# Patient Record
Sex: FEMALE | Race: WHITE | NOT HISPANIC OR LATINO | URBAN - METROPOLITAN AREA
[De-identification: names, ages, dates, MRNs, and addresses within clinical notes are randomized per-mention and may not be internally consistent; named-entity substitution may affect disease eponyms.]

---

## 2017-05-04 ENCOUNTER — COMMUNICATION - HEALTHEAST (OUTPATIENT)
Dept: HEALTH INFORMATION MANAGEMENT | Facility: CLINIC | Age: 32
End: 2017-05-04

## 2019-05-06 ENCOUNTER — OFFICE VISIT - HEALTHEAST (OUTPATIENT)
Dept: FAMILY MEDICINE | Facility: CLINIC | Age: 34
End: 2019-05-06

## 2019-05-06 DIAGNOSIS — Z00.00 ROUTINE GENERAL MEDICAL EXAMINATION AT A HEALTH CARE FACILITY: ICD-10-CM

## 2019-05-06 DIAGNOSIS — Z12.4 SCREENING FOR CERVICAL CANCER: ICD-10-CM

## 2019-05-06 DIAGNOSIS — Z13.1 SCREENING FOR DIABETES MELLITUS: ICD-10-CM

## 2019-05-06 DIAGNOSIS — Z78.9 VEGAN DIET: ICD-10-CM

## 2019-05-06 LAB
ANION GAP SERPL CALCULATED.3IONS-SCNC: 13 MMOL/L (ref 5–18)
BUN SERPL-MCNC: 7 MG/DL (ref 8–22)
CALCIUM SERPL-MCNC: 9.4 MG/DL (ref 8.5–10.5)
CHLORIDE BLD-SCNC: 105 MMOL/L (ref 98–107)
CO2 SERPL-SCNC: 22 MMOL/L (ref 22–31)
CREAT SERPL-MCNC: 0.67 MG/DL (ref 0.6–1.1)
ERYTHROCYTE [DISTWIDTH] IN BLOOD BY AUTOMATED COUNT: 12.1 % (ref 11–14.5)
GFR SERPL CREATININE-BSD FRML MDRD: >60 ML/MIN/1.73M2
GLUCOSE BLD-MCNC: 117 MG/DL (ref 70–125)
HBA1C MFR BLD: 5.6 % (ref 3.5–6)
HCT VFR BLD AUTO: 40.5 % (ref 35–47)
HGB BLD-MCNC: 13.5 G/DL (ref 12–16)
IRON SATN MFR SERPL: 17 % (ref 20–50)
IRON SERPL-MCNC: 55 UG/DL (ref 42–175)
MCH RBC QN AUTO: 28.5 PG (ref 27–34)
MCHC RBC AUTO-ENTMCNC: 33.4 G/DL (ref 32–36)
MCV RBC AUTO: 85 FL (ref 80–100)
PLATELET # BLD AUTO: 283 THOU/UL (ref 140–440)
PMV BLD AUTO: 8.7 FL (ref 7–10)
POTASSIUM BLD-SCNC: 3.9 MMOL/L (ref 3.5–5)
RBC # BLD AUTO: 4.74 MILL/UL (ref 3.8–5.4)
SODIUM SERPL-SCNC: 140 MMOL/L (ref 136–145)
TIBC SERPL-MCNC: 323 UG/DL (ref 313–563)
TRANSFERRIN SERPL-MCNC: 258 MG/DL (ref 212–360)
WBC: 5.5 THOU/UL (ref 4–11)

## 2019-05-06 ASSESSMENT — MIFFLIN-ST. JEOR: SCORE: 1109.32

## 2019-05-07 LAB
25(OH)D3 SERPL-MCNC: 26.6 NG/ML (ref 30–80)
25(OH)D3 SERPL-MCNC: 26.6 NG/ML (ref 30–80)
FERRITIN SERPL-MCNC: 13 NG/ML (ref 10–130)
HPV SOURCE: NORMAL
HUMAN PAPILLOMA VIRUS 16 DNA: NEGATIVE
HUMAN PAPILLOMA VIRUS 18 DNA: NEGATIVE
HUMAN PAPILLOMA VIRUS FINAL DIAGNOSIS: NORMAL
HUMAN PAPILLOMA VIRUS OTHER HR: NEGATIVE
SPECIMEN DESCRIPTION: NORMAL
VIT B12 SERPL-MCNC: 1716 PG/ML (ref 213–816)

## 2019-05-10 LAB
BKR LAB AP ABNORMAL BLEEDING: NO
BKR LAB AP BIRTH CONTROL/HORMONES: NORMAL
BKR LAB AP CERVICAL APPEARANCE: NORMAL
BKR LAB AP GYN ADEQUACY: NORMAL
BKR LAB AP GYN INTERPRETATION: NORMAL
BKR LAB AP HPV REFLEX: NORMAL
BKR LAB AP LMP: NORMAL
BKR LAB AP PATIENT STATUS: NORMAL
BKR LAB AP PREVIOUS ABNORMAL: NORMAL
BKR LAB AP PREVIOUS NORMAL: NORMAL
HIGH RISK?: YES
PATH REPORT.COMMENTS IMP SPEC: NORMAL
RESULT FLAG (HE HISTORICAL CONVERSION): NORMAL

## 2020-08-27 ENCOUNTER — TRANSFERRED RECORDS (OUTPATIENT)
Dept: HEALTH INFORMATION MANAGEMENT | Facility: CLINIC | Age: 35
End: 2020-08-27

## 2020-08-28 ENCOUNTER — TELEPHONE (OUTPATIENT)
Dept: OPHTHALMOLOGY | Facility: CLINIC | Age: 35
End: 2020-08-28

## 2020-08-28 ENCOUNTER — OFFICE VISIT (OUTPATIENT)
Dept: OPHTHALMOLOGY | Facility: CLINIC | Age: 35
End: 2020-08-28
Attending: OPHTHALMOLOGY
Payer: COMMERCIAL

## 2020-08-28 DIAGNOSIS — B60.13 ACANTHAMOEBA KERATITIS: Primary | ICD-10-CM

## 2020-08-28 DIAGNOSIS — B60.10 ACANTHAMOEBA INFECTION: Primary | ICD-10-CM

## 2020-08-28 DIAGNOSIS — B60.13 ACANTHAMOEBA KERATITIS: ICD-10-CM

## 2020-08-28 DIAGNOSIS — H18.822 CONTACT LENS OVERWEAR OF LEFT EYE: ICD-10-CM

## 2020-08-28 DIAGNOSIS — H04.122 DRY EYE OF LEFT SIDE: ICD-10-CM

## 2020-08-28 DIAGNOSIS — H18.20 CORNEAL EDEMA OF LEFT EYE: ICD-10-CM

## 2020-08-28 LAB
GRAM STN SPEC: NORMAL
KOH PREP SPEC: NORMAL
SPECIMEN SOURCE: NORMAL
SPECIMEN SOURCE: NORMAL

## 2020-08-28 PROCEDURE — 87081 CULTURE SCREEN ONLY: CPT | Performed by: STUDENT IN AN ORGANIZED HEALTH CARE EDUCATION/TRAINING PROGRAM

## 2020-08-28 PROCEDURE — 92285 EXTERNAL OCULAR PHOTOGRAPHY: CPT | Mod: ZF | Performed by: OPHTHALMOLOGY

## 2020-08-28 PROCEDURE — 87207 SMEAR SPECIAL STAIN: CPT | Performed by: STUDENT IN AN ORGANIZED HEALTH CARE EDUCATION/TRAINING PROGRAM

## 2020-08-28 PROCEDURE — 87205 SMEAR GRAM STAIN: CPT | Performed by: STUDENT IN AN ORGANIZED HEALTH CARE EDUCATION/TRAINING PROGRAM

## 2020-08-28 PROCEDURE — 92132 CPTRZD OPH DX IMG ANT SGM: CPT | Mod: ZF | Performed by: OPHTHALMOLOGY

## 2020-08-28 PROCEDURE — G0463 HOSPITAL OUTPT CLINIC VISIT: HCPCS | Mod: ZF

## 2020-08-28 PROCEDURE — 87015 SPECIMEN INFECT AGNT CONCNTJ: CPT | Performed by: STUDENT IN AN ORGANIZED HEALTH CARE EDUCATION/TRAINING PROGRAM

## 2020-08-28 PROCEDURE — 65430 CORNEAL SMEAR: CPT | Mod: ZF | Performed by: OPHTHALMOLOGY

## 2020-08-28 PROCEDURE — 87070 CULTURE OTHR SPECIMN AEROBIC: CPT | Performed by: STUDENT IN AN ORGANIZED HEALTH CARE EDUCATION/TRAINING PROGRAM

## 2020-08-28 PROCEDURE — 87075 CULTR BACTERIA EXCEPT BLOOD: CPT | Performed by: STUDENT IN AN ORGANIZED HEALTH CARE EDUCATION/TRAINING PROGRAM

## 2020-08-28 PROCEDURE — 87210 SMEAR WET MOUNT SALINE/INK: CPT | Performed by: STUDENT IN AN ORGANIZED HEALTH CARE EDUCATION/TRAINING PROGRAM

## 2020-08-28 PROCEDURE — 87076 CULTURE ANAEROBE IDENT EACH: CPT | Performed by: STUDENT IN AN ORGANIZED HEALTH CARE EDUCATION/TRAINING PROGRAM

## 2020-08-28 PROCEDURE — 87102 FUNGUS ISOLATION CULTURE: CPT | Performed by: STUDENT IN AN ORGANIZED HEALTH CARE EDUCATION/TRAINING PROGRAM

## 2020-08-28 RX ORDER — MOXIFLOXACIN 5 MG/ML
1 SOLUTION/ DROPS OPHTHALMIC 4 TIMES DAILY
COMMUNITY
End: 2021-05-07

## 2020-08-28 RX ORDER — IBUPROFEN 800 MG/1
800 TABLET, FILM COATED ORAL EVERY 6 HOURS PRN
Qty: 30 TABLET | Refills: 1 | Status: SHIPPED | OUTPATIENT
Start: 2020-08-28 | End: 2021-03-30

## 2020-08-28 RX ORDER — VALACYCLOVIR HYDROCHLORIDE 500 MG/1
500 TABLET, FILM COATED ORAL 2 TIMES DAILY
COMMUNITY
End: 2020-09-23

## 2020-08-28 ASSESSMENT — SLIT LAMP EXAM - LIDS
COMMENTS: NORMAL
COMMENTS: NORMAL

## 2020-08-28 ASSESSMENT — TONOMETRY
OD_IOP_MMHG: 21
IOP_METHOD: ICARE
OS_IOP_MMHG: 11

## 2020-08-28 ASSESSMENT — VISUAL ACUITY
OD_CC: 20/20
METHOD: SNELLEN - LINEAR
CORRECTION_TYPE: GLASSES
OS_CC: CF
OS_PH_CC: 20/100

## 2020-08-28 ASSESSMENT — CONF VISUAL FIELD
OD_NORMAL: 1
METHOD: COUNTING FINGERS
OS_NORMAL: 1

## 2020-08-28 NOTE — PATIENT INSTRUCTIONS
USE PHMB DROPS EVERY 1 HOUR WHILE AWAKE. IF YOU WAKE UP, PUT A DROP IN THE EYE.    USE MOXIFLOXACIN (TAN CAP): 4x/ DAY    CONTINUE VALTREX PILLS 3x/ DAY    USE ARTIFICIAL TEARS EVERY 2-3 HOURS (NEVER RIGHT AFTER A MEDICATION)

## 2020-08-28 NOTE — NURSING NOTE
Chief Complaints and History of Present Illnesses   Patient presents with     Corneal Evaluation     Chief Complaint(s) and History of Present Illness(es)     Corneal Evaluation     Laterality: left eye    Associated symptoms: eye pain, redness, tearing and photophobia    Treatments tried: eye drops and artificial tears    Pain scale: 5/10              Comments          Dr. Sarai Bassett referred patient due to suspicious for acanthamoeba left eye.  On Tuesday (10 days ago) she started having aching in her left eye.  On Monday she was seen at [facility] and was diagnosed with herpetic keratitis and started on antiviral medication, both oral valacyclovir and moxifloxacin.  The medications did not help, she was then referred to Dr Bassett and this appointment.  Her vision is currently quite blurred.    She wore contacts daily wear contacts until the pain started 10 days ago, often wearing them several days in row and keeping them in saline over night.    Frances Stoddard, CHASE 11:29 AM  August 28, 2020

## 2020-08-28 NOTE — TELEPHONE ENCOUNTER
Dr. Sarai Bassett referring pt to eye clinic     Pt seen by Dr. Bassett today for first time    suspicious for acanthamoeba     Left eye treated currently treated by another provider for herpetic etiology starting earlier this week    Reviewed with cornea fellow and scheduled this AM with Dr. Jagruti Charles RN 10:01 AM 08/28/20

## 2020-08-28 NOTE — PROGRESS NOTES
CC: Possible Acanthamoeba     Referring provider: Dr. 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 Patient reports that she noticed redness of the eye starting 10 days ago (Tues 8/18). It was painful but without significant discharge. She saw an optometrist on Monday 8/24 who noted epithelial staining that looked like a dendrite. She was started on Valtrex 500 TID and moxifloxacin. She was not improving, so she was referred to the cornea specialist Dr. Sarai Bassett who noted perineural infiltrates concerning for acanthamoeba and sent patient to [facility] same day for evaluation and confocal microscopy.    Patient is CL wearer. She has daily disposable CL's but will wear the lenses for multiple days at a time after they are placed in saline.     Interval Hx: New patient to Dr. Chand. Patient has eye pain OS, blurry vision OS, photophobia, mild tearing, no significant discharge, no new flashes, floaters, diplopia. Not wearing contact lenses since onset of symptoms.    POHx:  CTL wearer: Yes    Family hx of eye disease: No  Social Hx: (-) smoking, (social) EtOH, (-) illicit drugs    Meds:  Valtrex 500 TID  Moxifloxacin QID    Surgical Hx:  None    A/P:  #Early Acanthamoeba keratitis, left eye  - symptoms began ~1.5 weeks ago (8/18), has been treated with Valtrex & moxifloxacin - has NOT received any topical steroids  - radial perineuritis evident on exam  - confocal 8/28 showing signet ring cells located in epithelium and superficial stroma, but did not appear in deeper stroma  - slit lamp photos today  - central 8 mm of epithelium debrided at slit lamp, and samples obtained for Acanthamoeba culture, KOH, gram stain, and routine anaerobic and aerobic culture.   - bandage CL placed (Acuvue Oasys 8.4) - two additional BCL's provided just in case this one comes out  - Meds:   - begin PHMB drops q1 hour while awake   - continue moxifloxacin  QID   - artificial tears every 2-3 hours   - for pain - ibuprofen 800 mg every 6 hours, alternate with Tylenol (pt reports she had limited renal eval for possible kidney donation for her father. She was told her kidney is large, but renal function is normal, and she has not been told to avoid renally excreted medications)  - discussed warning sign's and symptoms to call ASAP  - discussed importance of adhering to good CL hygiene to avoid infection in future      Follow up: Monday 8/31 - SLP OS  --    Alicia Guido MD  Cornea & External Disease Fellow    Attending Physician Attestation:  Complete documentation of historical and exam elements from today's encounter can be found in the full encounter summary report (not reduplicated in this progress note).  I personally obtained the chief complaint(s) and history of present illness.  I confirmed and edited as necessary the review of systems, past medical/surgical history, family history, social history, and examination findings as documented by others; and I examined the patient myself.  I personally reviewed the relevant tests, images, and reports as documented above.  I formulated and edited as necessary the assessment and plan and discussed the findings and management plan with the patient and family. I personally reviewed the ophthalmic test(s) associated with this encounter, agree with the interpretation(s) as documented by the resident/fellow, and have edited the corresponding report(s) as necessary. I was present for the key portions of the procedure and immediately available for the remainder. - Jose G Chand MD    I personally spent great than 40min with the patient, of which >50% of the time was spent face to face with the patient, counseling and coordinating care with the patient. This excludes time spent performing the procedure. We discussed the complexity of her diagnosis, the need for further information prior to proceeding with yet another  surgery, and the unknown prognosis for the patient at this time.    Jose G Chand MD

## 2020-08-29 LAB
PARASITE SPEC INSPECT: NORMAL
PARASITE SPEC INSPECT: NORMAL
SPECIMEN SOURCE: NORMAL

## 2020-08-31 ENCOUNTER — TELEPHONE (OUTPATIENT)
Dept: OPHTHALMOLOGY | Facility: CLINIC | Age: 35
End: 2020-08-31
Payer: COMMERCIAL

## 2020-08-31 ENCOUNTER — OFFICE VISIT (OUTPATIENT)
Dept: OPHTHALMOLOGY | Facility: CLINIC | Age: 35
End: 2020-08-31
Attending: OPHTHALMOLOGY
Payer: COMMERCIAL

## 2020-08-31 DIAGNOSIS — B60.13 ACANTHAMOEBA KERATITIS: Primary | ICD-10-CM

## 2020-08-31 DIAGNOSIS — H04.122 DRY EYE OF LEFT SIDE: ICD-10-CM

## 2020-08-31 DIAGNOSIS — H18.20 CORNEAL EDEMA OF LEFT EYE: ICD-10-CM

## 2020-08-31 DIAGNOSIS — H18.822 CONTACT LENS OVERWEAR OF LEFT EYE: ICD-10-CM

## 2020-08-31 LAB
BACTERIA SPEC CULT: ABNORMAL
BACTERIA SPEC CULT: ABNORMAL
SPECIMEN SOURCE: ABNORMAL

## 2020-08-31 PROCEDURE — G0463 HOSPITAL OUTPT CLINIC VISIT: HCPCS | Mod: ZF

## 2020-08-31 RX ORDER — CETIRIZINE HYDROCHLORIDE 10 MG/1
10 TABLET ORAL DAILY
COMMUNITY

## 2020-08-31 ASSESSMENT — VISUAL ACUITY
METHOD: SNELLEN - LINEAR
OS_PH_CC: 20/30
OS_CC: 20/60-
OD_CC: 20/20

## 2020-08-31 ASSESSMENT — TONOMETRY
IOP_METHOD: ICARE
OD_IOP_MMHG: 17
OS_IOP_MMHG: 11

## 2020-08-31 ASSESSMENT — SLIT LAMP EXAM - LIDS
COMMENTS: NORMAL
COMMENTS: NORMAL

## 2020-08-31 NOTE — PROGRESS NOTES
CC: Possible Acanthamoeba     Referring provider: Dr. Sarai Basstet    HPI:  Chandrakant Cruz is a(n) 34 year old female who presents for urgent evaluation of possible acanthamoeba keratitis. Patient reports that she noticed redness of the eye starting 10 days ago (Tues 8/18). It was painful but without significant discharge. She saw an optometrist on Monday 8/24 who noted epithelial staining that looked like a dendrite. She was started on Valtrex 500 TID and moxifloxacin. She was not improving, so she was referred to the cornea specialist Dr. Sarai Bassett who noted perineural infiltrates concerning for acanthamoeba and sent patient to [facility] same day for evaluation and confocal microscopy.    Patient is CL wearer. She has daily disposable CL's but will wear the lenses for multiple days at a time after they are placed in saline.     Interval Hx: MUCH improved since last visit. Vision improved yesterday. Pain is improving as well. Still has redness. She has some irritation with the PHMB drops but it is tolerable. Not using Flonase d/t active infection. No new flashes, floaters, or diplopia. No tearing.     POHx:  CTL wearer: Yes    Family hx of eye disease: No  Social Hx: (-) smoking, (social) EtOH, (-) illicit drugs    Meds:  Valtrex 500 TID  Moxifloxacin QID    Surgical Hx:  None    A/P:  #Early Acanthamoeba keratitis, left eye  - symptoms began ~8/18, initially treated with Valtrex & moxifloxacin but had NOT received any topical steroids  - radial perineuritis evident on exam  - confocal 8/28 showing signet ring cells located in epithelium and superficial stroma, but did not appear in deeper stroma  - slit lamp photos today- taken but did not get saved unfortunately  - central 8 mm of epithelium debrided at slit lamp, and samples obtained for Acanthamoeba culture, KOH, gram stain, and routine anaerobic and aerobic culture (8/28/20)  - culture positive for Acanthamoeba  - Meds:   - discontinue BCL   - Continue PHMB every 1  hour while awake   - Reduce moxifloxacin to BID   - Stop Valtrex   - Artificial tears every 2-3 hours   - For pain - ibuprofen 800 mg every 6 hours, alternate with Tylenol (pt reports she had limited renal eval for possible kidney donation for her father. She was told her kidney is large, but renal function is normal, and she has not been told to avoid renally excreted medications)  - discussed warning sign's and symptoms to call ASAP  - discussed importance of adhering to good CL hygiene to avoid infection in future    #Allergic conjunctivitis, both eyes  - pataday prn  - ok to use Flonase     Follow up: 1 week w/ SLP:BOBO Guido MD  Cornea & External Disease Fellow    --  Attending Physician Attestation:  Complete documentation of historical and exam elements from today's encounter can be found in the full encounter summary report (not reduplicated in this progress note).  I personally obtained the chief complaint(s) and history of present illness.  I confirmed and edited as necessary the review of systems, past medical/surgical history, family history, social history, and examination findings as documented by others; and I examined the patient myself.  I personally reviewed the relevant tests, images, and reports as documented above.  I formulated and edited as necessary the assessment and plan and discussed the findings and management plan with the patient and family. - Jose G Chand MD

## 2020-08-31 NOTE — PATIENT INSTRUCTIONS
USE PHMB DROPS EVERY 1 HOUR WHILE AWAKE. IF YOU WAKE UP, PUT A DROP IN THE EYE.    DECREASE MOXIFLOXACIN (TAN CAP): 2x/ DAY    STOP VALTREX PILLS    OK TO USE FLONASE    USE ARTIFICIAL TEARS EVERY 2-3 HOURS (NEVER RIGHT AFTER A MEDICATION), as able

## 2020-08-31 NOTE — TELEPHONE ENCOUNTER
Azucena from the micro lab called to report results.  Parasite culture from the left eye collected on August 28th is Positive for Acanthamoeba

## 2020-09-03 ENCOUNTER — TELEPHONE (OUTPATIENT)
Dept: OPHTHALMOLOGY | Facility: CLINIC | Age: 35
End: 2020-09-03

## 2020-09-03 NOTE — TELEPHONE ENCOUNTER
I agree that PHMB can be irritating. I am forwarding this message to Dr. Guido as she saw the patient on Monday. We will let the patient know if she can use BCL or any other strategies.

## 2020-09-03 NOTE — TELEPHONE ENCOUNTER
ADDENDUM 9/3 at 12:30 PM: I called and spoke with patient. She reports she is not having that much discomfort with the PHMB drops themselves, but the eye in general was just hurting more. She is waking up with the eye more swollen and closed. Vision is about the same. No increase in drainage from the eye. We discussed that this level of pain is unfortunately expected from this type of infection. I recommended regular artificial tears (but never after PHMB dose), cold compresses, and regular Tylenol/ibuprofen. Discussed that if she wants, Dr. Chand is OK with her placing a bandage contact lens in the eye. Patient does not feel this is necessary at this time and wants maximal treatment efficacy from the drops, so she will hold off. Dr. Chand also OK'd her to switch to q2 hour PHMB after one week of treatment. Mutually agreed for her to continue q1hour dose through the weekend, and on Monday she can switch to q2 hour PHMB. All of her questions were answered. She will call with change in symptoms/worsening course.    Alicia Guido MD  Cornea & External Disease Fellow          Pt calling clinic this AM to review eye pain    Pt last seen on Monday and currently being treated for acanthamoeba    Bandage lens taking out this last Monday    Spoke to pt at 0830    Pt states increase burning and tearing past couple days    Pt using PHMB every hour WA and not been using PF artificial tears with new tearing     Pt concerned of worsening infection    Reviewed PHMB drop not comfortable and can have pain with this type of infection    Recommended restarting the preservative free artificial tears and will review with cornea team if would recommend placing bandage lens in eye (pt has couple at home yet) vs appt for pt concern of worsening infection vs other options/recommendations    Jordan Charles, BRANDON 8:37 AM 09/03/20

## 2020-09-04 LAB
BACTERIA SPEC CULT: NO GROWTH
SPECIMEN SOURCE: NORMAL

## 2020-09-05 LAB
BACTERIA SPEC CULT: ABNORMAL
BACTERIA SPEC CULT: ABNORMAL
Lab: ABNORMAL
SPECIMEN SOURCE: ABNORMAL

## 2020-09-09 ENCOUNTER — TELEPHONE (OUTPATIENT)
Dept: OPHTHALMOLOGY | Facility: CLINIC | Age: 35
End: 2020-09-09

## 2020-09-09 NOTE — TELEPHONE ENCOUNTER
[facility] called stating that Dr. Bassett hadn't gotten any notes after visit with Dr. Chand at the end of August - printed the visit notes and faxed to their office today.    CASEY Garrett 3:00 PM September 9, 2020

## 2020-09-10 ENCOUNTER — TELEPHONE (OUTPATIENT)
Dept: OPHTHALMOLOGY | Facility: CLINIC | Age: 35
End: 2020-09-10

## 2020-09-10 ENCOUNTER — OFFICE VISIT (OUTPATIENT)
Dept: OPHTHALMOLOGY | Facility: CLINIC | Age: 35
End: 2020-09-10
Attending: OPHTHALMOLOGY
Payer: COMMERCIAL

## 2020-09-10 DIAGNOSIS — H18.822 CONTACT LENS OVERWEAR OF LEFT EYE: ICD-10-CM

## 2020-09-10 DIAGNOSIS — H18.20 CORNEAL EDEMA OF LEFT EYE: ICD-10-CM

## 2020-09-10 DIAGNOSIS — B60.13 ACANTHAMOEBA KERATITIS: Primary | ICD-10-CM

## 2020-09-10 DIAGNOSIS — H04.122 DRY EYE OF LEFT SIDE: ICD-10-CM

## 2020-09-10 DIAGNOSIS — B60.10 ACANTHAMOEBA INFECTION: ICD-10-CM

## 2020-09-10 PROCEDURE — G0463 HOSPITAL OUTPT CLINIC VISIT: HCPCS | Mod: ZF

## 2020-09-10 ASSESSMENT — SLIT LAMP EXAM - LIDS
COMMENTS: NORMAL
COMMENTS: NORMAL

## 2020-09-10 ASSESSMENT — VISUAL ACUITY
CORRECTION_TYPE: GLASSES
OS_CC: HM
METHOD: SNELLEN - LINEAR
OD_CC: 20/20

## 2020-09-10 ASSESSMENT — CONF VISUAL FIELD
OS_INFERIOR_NASAL_RESTRICTION: 1
OS_SUPERIOR_TEMPORAL_RESTRICTION: 1
OS_SUPERIOR_NASAL_RESTRICTION: 1
OS_INFERIOR_TEMPORAL_RESTRICTION: 1
METHOD: COUNTING FINGERS
OD_NORMAL: 1

## 2020-09-10 ASSESSMENT — EXTERNAL EXAM - LEFT EYE: OS_EXAM: NORMAL

## 2020-09-10 ASSESSMENT — TONOMETRY
OS_IOP_MMHG: 14
OD_IOP_MMHG: 21
IOP_METHOD: ICARE

## 2020-09-10 ASSESSMENT — EXTERNAL EXAM - RIGHT EYE: OD_EXAM: NORMAL

## 2020-09-10 NOTE — NURSING NOTE
Chief Complaints and History of Present Illnesses   Patient presents with     Keratitis Follow Up     1.5 week follow up Acanthamoeba keratitis, left eye     Chief Complaint(s) and History of Present Illness(es)     Keratitis Follow Up     Laterality: left eye    Characteristics: constant    Associated symptoms: blurred vision, eye pain and photophobia    Pain scale: 7/10    Comments: 1.5 week follow up Acanthamoeba keratitis, left eye              Comments     Pt complains of having complete vision loss LE today - notes vision goes up and down. Sometimes she sees stuff and other times she doesn't.  Complains of having intermittent pain LE.  Complains of LE tearing constantly & being light sensitive.  Complains of LE cornea becoming opaque   Ocular meds: PHMB q1h LE, Moxifloxacin BID LE, & AT's occasionally.    Adina Slater OT 8:42 AM September 10, 2020

## 2020-09-10 NOTE — TELEPHONE ENCOUNTER
Worsening pain and vision loss times 2 days.  Gray/opaque cornea now  H/o Acanthamoeba     Scheduled with Dr. Akbar in open new time slot at 0830 today    Pt aware of date/time/location at [facility]    Jordan Charles RN 7:32 AM 09/10/20            [facility]    Phone Message    May a detailed message be left on voicemail: yes     Reason for Call: Symptoms or Concerns     If patient has red-flag symptoms, warm transfer to triage line    Current symptom or concern: Lt eye pupil is gray and she said it has gone completely blind    Symptoms have been present for:  2 day(s)    Has patient previously been seen for this? Yes    By : Dr Chand    Date: 08/31/20    Are there any new or worsening symptoms? Yes: said gotten worse again - knows she has an Appt tomm but wasn't sure if she needed to be checked today       Action Taken: Message routed to:  Other: [facility]    Travel Screening: Not Applicable

## 2020-09-10 NOTE — PROGRESS NOTES
"CC: Possible Acanthamoeba     Referring provider: Dr. 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 Patient reports that she noticed redness of the eye starting 10 days ago (Tues 8/18). It was painful but without significant discharge. She saw an optometrist on Monday 8/24 who noted epithelial staining that looked like a dendrite. She was started on Valtrex 500 TID and moxifloxacin. She was not improving, so she was referred to the cornea specialist Dr. Sarai Bassett who noted perineural infiltrates concerning for acanthamoeba and sent patient to [facility] same day for evaluation and confocal microscopy.    Patient is CL wearer. She has daily disposable CL's but will wear the lenses for multiple days at a time after they are placed in saline.     Interval Hx: Patient called in this morning due to 2 days of worsening pain and vision. Last visit with Dr. Chand was 10 days ago, but at that time she was \"much improved.\" She describes the vision as being as bad as when this first all started. It has fluctuated drastically in that time period - today VA HM. Still using PHMB Q1H (total 18x/day); Vigamox BID OS.       POHx:  CTL wearer: Yes    Family hx of eye disease: No  Social Hx: (-) smoking, (social) EtOH, (-) illicit drugs    Meds:    Moxifloxacin BID  PHMB Q1H while awake OS  Ibuprofen 800 mg Q6H   Tylenol 500mg prn       Surgical Hx:  None    A/P:  # Acanthamoeba keratitis, left eye  - symptoms began ~8/18, initially treated with Valtrex & moxifloxacin but had NOT received any topical steroids  - radial perineuritis evident on exam  - confocal 8/28 showing signet ring cells located in epithelium and superficial stroma, but did not appear in deeper stroma  - slit lamp photos today- taken but did not get saved unfortunately  - central 8 mm of epithelium debrided at slit lamp, and samples obtained for Acanthamoeba culture, KOH, gram stain, and " routine anaerobic and aerobic culture (8/28/20)  -Pt states decresed VA has increased over last 2 - 3 days (no photos on chart).  - culture positive for Acanthamoeba  - Meds:   - discontinue BCL   - Continue PHMB every 1 hour while awake   - Reduce moxifloxacin to BID   - Stop Valtrex   - Artificial tears every 2-3 hours   - Photos   - For pain - ibuprofen 600 mg every 6 hours, alternate with Tylenol (pt reports she had limited renal eval for possible kidney donation for her father. She was told her kidney is large, but renal function is normal, and she has not been told to avoid renally excreted medications)  - discussed warning sign's and symptoms to call ASAP  - discussed importance of adhering to good CL hygiene to avoid infection in future  -Disc possible need for increased PHMB concentration, addition of chlorhexadine or voriconizol gtts/oral.  - limit ice pack use  - cold temp drives amoeba deeper into cornea.    # Allergic conjunctivitis, both eyes  - pataday prn  - ok to use Flonase     Follow up: pt has appt tomorrow with Dr Chand.    Gerson Renteria MD  Ophthalmology PGY-3  [facility]     Attending Physician Attestation:  Complete documentation of historical and exam elements from today's encounter can be found in the full encounter summary report (not reduplicated in this progress note).  I personally obtained the chief complaint(s) and history of present illness.  I confirmed and edited as necessary the review of systems, past medical/surgical history, family history, social history, and examination findings as documented by others; and I examined the patient myself.  I personally reviewed the relevant tests, images, and reports as documented above.  I formulated and edited as necessary the assessment and plan and discussed the findings and management plan with the patient and family. - Roderick Akbar MD

## 2020-09-11 ENCOUNTER — OFFICE VISIT (OUTPATIENT)
Dept: OPHTHALMOLOGY | Facility: CLINIC | Age: 35
End: 2020-09-11
Attending: OPHTHALMOLOGY
Payer: COMMERCIAL

## 2020-09-11 DIAGNOSIS — H18.20 CORNEAL EDEMA OF LEFT EYE: ICD-10-CM

## 2020-09-11 DIAGNOSIS — B60.13 ACANTHAMOEBA KERATITIS: ICD-10-CM

## 2020-09-11 DIAGNOSIS — B60.13 ACANTHAMOEBA KERATITIS: Primary | ICD-10-CM

## 2020-09-11 DIAGNOSIS — H04.122 DRY EYE OF LEFT SIDE: ICD-10-CM

## 2020-09-11 PROCEDURE — 92285 EXTERNAL OCULAR PHOTOGRAPHY: CPT | Mod: ZF | Performed by: OPHTHALMOLOGY

## 2020-09-11 PROCEDURE — G0463 HOSPITAL OUTPT CLINIC VISIT: HCPCS | Mod: ZF

## 2020-09-11 RX ORDER — PREDNISOLONE ACETATE 10 MG/ML
1 SUSPENSION/ DROPS OPHTHALMIC DAILY
Qty: 10 ML | Refills: 1 | Status: SHIPPED | OUTPATIENT
Start: 2020-09-11 | End: 2020-12-23

## 2020-09-11 ASSESSMENT — VISUAL ACUITY
METHOD: SNELLEN - LINEAR
OD_CC: 20/20
OS_CC: HM
CORRECTION_TYPE: GLASSES

## 2020-09-11 ASSESSMENT — SLIT LAMP EXAM - LIDS
COMMENTS: NORMAL
COMMENTS: NORMAL

## 2020-09-11 ASSESSMENT — CONF VISUAL FIELD
OS_SUPERIOR_NASAL_RESTRICTION: 1
OS_INFERIOR_TEMPORAL_RESTRICTION: 1
OS_SUPERIOR_TEMPORAL_RESTRICTION: 1
OS_INFERIOR_NASAL_RESTRICTION: 1

## 2020-09-11 ASSESSMENT — TONOMETRY
IOP_METHOD: ICARE
OS_IOP_MMHG: 12
OD_IOP_MMHG: 20

## 2020-09-11 ASSESSMENT — EXTERNAL EXAM - LEFT EYE: OS_EXAM: NORMAL

## 2020-09-11 ASSESSMENT — EXTERNAL EXAM - RIGHT EYE: OD_EXAM: NORMAL

## 2020-09-11 NOTE — PROGRESS NOTES
"CC: Possible Acanthamoeba     Referring provider: Dr. 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 Patient reports that she noticed redness of the eye starting 10 days ago (Tues 8/18). It was painful but without significant discharge. She saw an optometrist on Monday 8/24 who noted epithelial staining that looked like a dendrite. She was started on Valtrex 500 TID and moxifloxacin. She was not improving, so she was referred to the cornea specialist Dr. Sarai Bassett who noted perineural infiltrates concerning for acanthamoeba and sent patient to [facility] same day for evaluation and confocal microscopy.    Patient is CL wearer. She has daily disposable CL's but will wear the lenses for multiple days at a time after they are placed in saline.     Interval Hx: Patient initially improved after starting PHMB treatment. Epi was healed from scraping at 3 day follow-up. Since then she is having worsened pain and vision. She reports that the vision got much worse very quickly last week. She has had two nights of intense pain and photophobia (even iPhone light bothersome), but most nights she has just \"normal pain\" with irritation. No new flashes, floaters, diplopia.       POHx:  CTL wearer: Yes    Family hx of eye disease: No  Social Hx: (-) smoking, (social) EtOH, (-) illicit drugs    Meds:    Moxifloxacin BID  PHMB Q1H around the clock OS  Ibuprofen 600 mg Q6H   Tylenol 500mg prn       Surgical Hx:  None    A/P:  # Acanthamoeba keratitis, left eye  - symptoms began ~8/18, initially treated with Valtrex & moxifloxacin but had NOT received any topical steroids  - radial perineuritis evident on exam  - confocal 8/28 showing signet ring cells located in epithelium and superficial stroma, but did not appear in deeper stroma  - slit lamp photos taken upon presentation, but did not get saved unfortunately  - central 8 mm of epithelium debrided at slit " lamp, and samples obtained for Acanthamoeba culture, KOH, gram stain, and routine anaerobic and aerobic culture (8/28/20) - culture positive for Acanthamoeba, otherwise only grew P acnes in broth only (unlikely pathogenic)  - started q1 hour PHMB on 8/28/20 (initially q1 hour while awake only as instructed, then around the clock starting 9/9  - 9/11: suspect initial scraping relieved active acanthamoeba trophozoites and subsequent worsening vision and pain due to encysted parasites waking up with robust inflammatory response as Acanthamoeba are dying VERSUS worsening of infection/ possible inadequate penetration of PHMB. Therefore will increase PHMB concentration and start steroid as below.  - Discussed if improving pain it is likely due to steroid and not necessarily improving infection, therefore need ongoing close follow-up.  - Meds:   - Continue PHMB 0.2 mg/ml every 1 hour- patient doing this around the clock. Increase concentration to 0.4 mg/ml for 1 week. Use q2 hours overnight.   - Add Pred Forte once daily only - stressed to only use once daily   - will start paperwork for miltefosine   - Continue moxifloxacin BID   - Artificial tears every 2-3 hours   - Photos today to monitor progression.   - For pain - ibuprofen 600 mg every 6 hours, alternate with Tylenol (pt reports she had limited renal eval for possible kidney donation for her father. She was told her kidney is large, but renal function is normal, and she has not been told to avoid renally excreted medications)  - discussed warning sign's and symptoms to call ASAP  - discussed importance of adhering to good CL hygiene to avoid infection in future  -Disc possible need for increased PHMB concentration, addition of chlorhexadine or voriconizol gtts/oral.  - limit ice pack use  - cold temp drives amoeba deeper into cornea.    # Allergic conjunctivitis, both eyes  - pataday prn  - ok to use Flonase     Follow up: Monday 9/14    Alicia Guido  MD  Cornea & External Disease Fellow    Attending Physician Attestation:  Complete documentation of historical and exam elements from today's encounter can be found in the full encounter summary report (not reduplicated in this progress note).  I personally obtained the chief complaint(s) and history of present illness.  I confirmed and edited as necessary the review of systems, past medical/surgical history, family history, social history, and examination findings as documented by others; and I examined the patient myself.  I personally reviewed the relevant tests, images, and reports as documented above.  I formulated and edited as necessary the assessment and plan and discussed the findings and management plan with the patient and family. - Jose G Chand MD    I personally spent great than 40min with the patient, of which >50% of the time was spent face to face with the patient, counseling and coordinating care with the patient. We discussed the complexity of her diagnosis, the need for further information prior to proceeding with surgery, and the unknown prognosis for the patient at this time.    Jose G Chand MD

## 2020-09-11 NOTE — NURSING NOTE
Chief Complaint(s) and History of Present Illness(es)     Keratitis Follow Up     In left eye.  Associated symptoms include eye pain, lid swelling and irritation.  Negative for dryness.  Pain was noted as 7/10.              Comments     1 day f/u Acanthamoeba Keratitis, LE. Pt notes the LE is the same as it was yesterday. LE is not getting any better. Vision is the same as it was yesterday as well no changes. LE is very watery.     Ocular meds:  Moxifloxacin BID LE  PHMB Q1H while awake OS  Ibuprofen 800 mg Q6H   Tylenol 500mg prn     CHRIS Wright 8:51 AM September 11, 2020

## 2020-09-14 ENCOUNTER — OFFICE VISIT (OUTPATIENT)
Dept: OPHTHALMOLOGY | Facility: CLINIC | Age: 35
End: 2020-09-14
Attending: OPHTHALMOLOGY
Payer: COMMERCIAL

## 2020-09-14 DIAGNOSIS — B60.10 ACANTHAMOEBA INFECTION: ICD-10-CM

## 2020-09-14 DIAGNOSIS — B60.13 ACANTHAMOEBA KERATITIS: ICD-10-CM

## 2020-09-14 DIAGNOSIS — B60.13 ACANTHAMOEBA KERATITIS: Primary | ICD-10-CM

## 2020-09-14 DIAGNOSIS — H04.122 DRY EYE OF LEFT SIDE: ICD-10-CM

## 2020-09-14 PROCEDURE — 92285 EXTERNAL OCULAR PHOTOGRAPHY: CPT | Mod: ZF | Performed by: OPHTHALMOLOGY

## 2020-09-14 PROCEDURE — G0463 HOSPITAL OUTPT CLINIC VISIT: HCPCS | Mod: ZF

## 2020-09-14 ASSESSMENT — TONOMETRY
OD_IOP_MMHG: 18
OS_IOP_MMHG: 13
IOP_METHOD: ICARE

## 2020-09-14 ASSESSMENT — VISUAL ACUITY
METHOD: SNELLEN - LINEAR
OD_CC: 20/20
CORRECTION_TYPE: GLASSES
OS_CC: 20/125
OD_CC+: -1
OS_CC+: -1
OS_PH_CC: 20/70

## 2020-09-14 ASSESSMENT — EXTERNAL EXAM - LEFT EYE: OS_EXAM: NORMAL

## 2020-09-14 ASSESSMENT — CONF VISUAL FIELD
OD_NORMAL: 1
OS_NORMAL: 1
METHOD: COUNTING FINGERS

## 2020-09-14 ASSESSMENT — EXTERNAL EXAM - RIGHT EYE: OD_EXAM: NORMAL

## 2020-09-14 ASSESSMENT — SLIT LAMP EXAM - LIDS
COMMENTS: NORMAL
COMMENTS: NORMAL

## 2020-09-14 NOTE — NURSING NOTE
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  Notes she has minimal pain now.  Complains of LE still tearing occasionally & having some crusty stuff in the corners.  Complains of LE still being red.  Ocular meds: PHMB q1h LE, Prednisolone daily LE, Moxifloxacin BID LE, & AT's rarely LE.    Adina Slater OT 1:38 PM September 14, 2020

## 2020-09-14 NOTE — PROGRESS NOTES
CC: Acanthamoeba keratitis    Referring provider: Dr. 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 Patient reports that she noticed redness of the eye starting 10 days ago (Tues 8/18). It was painful but without significant discharge. She saw an optometrist on Monday 8/24 who noted epithelial staining that looked like a dendrite. She was started on Valtrex 500 TID and moxifloxacin. She was not improving, so she was referred to the cornea specialist Dr. Sarai Bassett who noted perineural infiltrates concerning for acanthamoeba and sent patient to [facility] same day for evaluation and confocal microscopy.    Patient is CL wearer. She has daily disposable CL's but will wear the lenses for multiple days at a time after they are placed in saline.     Interval Hx: Patient reports things are MUCH improved. Vision improved. Pain is better, redness better. No new discharge. No new flashes, floaters, or diplopia. Hasn't heard re: miltefosine yet.    POHx:  CTL wearer: Yes    Family hx of eye disease: No  Social Hx: (-) smoking, (social) EtOH, (-) illicit drugs    Meds:    Moxifloxacin BID  PHMB - started 8/28, increased to 0.4 mg/ml on 9/11 --- using q1 hour around the clock  Ibuprofen 600 mg Q6H   Tylenol 500mg prn       Surgical Hx:  None    A/P:  # Acanthamoeba keratitis, left eye  - symptoms began ~8/18, initially treated with Valtrex & moxifloxacin but had NOT received any topical steroids  - radial perineuritis evident on exam  - confocal 8/28 showing signet ring cells located in epithelium and superficial stroma, but did not appear in deeper stroma  - slit lamp photos taken upon presentation, but did not get saved unfortunately  - central 8 mm of epithelium debrided at slit lamp, and samples obtained for Acanthamoeba culture, KOH, gram stain, and routine anaerobic and aerobic culture (8/28/20) - culture positive for Acanthamoeba, otherwise  only grew P acnes in broth only (unlikely pathogenic)  - started q1 hour PHMB on 8/28/20 (initially q1 hour while awake only as instructed, then around the clock starting 9/9  - 9/11: suspect initial scraping relieved active acanthamoeba trophozoites and subsequent worsening vision and pain due to encysted parasites waking up with robust inflammatory response as Acanthamoeba are dying VERSUS worsening of infection/ possible inadequate penetration of PHMB. Therefore increased PHMB concentration and started steroid  - Discussed if improving pain it is likely due to steroid and not necessarily improving infection, therefore need ongoing close follow-up.  - 9/14: Epi defect HEALED. Vision improved. Infiltrate improved. Redness improved.  - Meds: NO changes   - PHMB 0.4 mg/ml -- tolerating increased concentration fine. Continue q1hour around the clock.  (ordered to [facility]Y on 9/11/2020 with 3 refills remain as of 9/14/2020)   - PF once daily   - will start paperwork for miltefosine (pending, ordered 9/11)   - Continue moxifloxacin BID   - Artificial tears every 2-3 hours   - Photos today to monitor progression.   - For pain - ibuprofen 600 mg every 6 hours, alternate with Tylenol (pt reports she had limited renal eval for possible kidney donation for her father. She was told her kidney is large, but renal function is normal, and she has not been told to avoid renally excreted medications)  - discussed warning sign's and symptoms to call ASAP  - discussed importance of adhering to good CL hygiene to avoid infection in future  -Disc possible need for addition of chlorhexadine or voriconizol gtts/oral.   - limit ice pack use  - cold temp drives amoeba deeper into cornea.    # Allergic conjunctivitis, both eyes  - pataday prn  - ok to use Flonase     Follow up: 1 week    Alicia Guido MD  Cornea & External Disease Fellow    Attending Physician Attestation:  Complete documentation of historical and exam elements from today's  encounter can be found in the full encounter summary report (not reduplicated in this progress note).  I personally obtained the chief complaint(s) and history of present illness.  I confirmed and edited as necessary the review of systems, past medical/surgical history, family history, social history, and examination findings as documented by others; and I examined the patient myself.  I personally reviewed the relevant tests, images, and reports as documented above.  I formulated and edited as necessary the assessment and plan and discussed the findings and management plan with the patient and family. I personally reviewed the ophthalmic test(s) associated with this encounter, agree with the interpretation(s) as documented by the resident/fellow, and have edited the corresponding report(s) as necessary. - Jose G Chand MD    I personally spent great than 40min with the patient, of which >50% of the time was spent face to face with the patient, counseling and coordinating care with the patient. We discussed the complexity of her diagnosis, the need for further information prior to proceeding with yet another surgery, and the unknown prognosis for the patient at this time.    Jose G Chand MD

## 2020-09-15 NOTE — ADDENDUM NOTE
Addended by: NICKI JOHNSTON on: 9/14/2020 08:13 PM     Modules accepted: Orders, Level of Service

## 2020-09-23 ENCOUNTER — OFFICE VISIT (OUTPATIENT)
Dept: OPHTHALMOLOGY | Facility: CLINIC | Age: 35
End: 2020-09-23
Attending: OPHTHALMOLOGY
Payer: COMMERCIAL

## 2020-09-23 DIAGNOSIS — B60.13 ACANTHAMOEBA KERATITIS: ICD-10-CM

## 2020-09-23 DIAGNOSIS — B60.13 ACANTHAMOEBA KERATITIS: Primary | ICD-10-CM

## 2020-09-23 PROCEDURE — G0463 HOSPITAL OUTPT CLINIC VISIT: HCPCS | Mod: ZF

## 2020-09-23 PROCEDURE — 92285 EXTERNAL OCULAR PHOTOGRAPHY: CPT | Mod: ZF | Performed by: OPHTHALMOLOGY

## 2020-09-23 RX ORDER — IBUPROFEN 600 MG/1
600 TABLET, FILM COATED ORAL EVERY 6 HOURS
COMMUNITY
End: 2021-03-30

## 2020-09-23 RX ORDER — FLUTICASONE PROPIONATE 50 MCG
1 SPRAY, SUSPENSION (ML) NASAL DAILY
COMMUNITY
End: 2021-03-30

## 2020-09-23 ASSESSMENT — VISUAL ACUITY
METHOD: SNELLEN - LINEAR
OS_SC+: -1
OS_PH_SC: 20/70
OD_SC: 20/25
CORRECTION_TYPE: GLASSES
OS_SC: 20/125
OD_SC+: +2

## 2020-09-23 ASSESSMENT — EXTERNAL EXAM - LEFT EYE: OS_EXAM: NORMAL

## 2020-09-23 ASSESSMENT — CONF VISUAL FIELD
OD_NORMAL: 1
OS_NORMAL: 1

## 2020-09-23 ASSESSMENT — TONOMETRY
IOP_METHOD: ICARE
OS_IOP_MMHG: 09
OD_IOP_MMHG: 16

## 2020-09-23 ASSESSMENT — SLIT LAMP EXAM - LIDS
COMMENTS: NORMAL
COMMENTS: NORMAL

## 2020-09-23 ASSESSMENT — EXTERNAL EXAM - RIGHT EYE: OD_EXAM: NORMAL

## 2020-09-23 NOTE — NURSING NOTE
Chief Complaints and History of Present Illnesses   Patient presents with     Follow Up     Chief Complaint(s) and History of Present Illness(es)     Follow Up     Laterality: left eye    Associated symptoms: tearing and eye pain    Pain scale: 4/10              Comments     Follow up Acanthamoeba Keratitis Left eye.      The patient takes 600mg Ibuprofen every six hours for pain.  She is using .04 % PHMD drops in the left eye hourly around the clock, moxifloxacin twice daily, Pred Forte once daily in the left eye.  She notes her left eye is irritated.Claritza Loyd, COA, COA 2:16 PM 09/23/2020

## 2020-09-23 NOTE — PATIENT INSTRUCTIONS
Continue all gtts as previous, but only use PHMB every hour while awake, no use 8 hours overnight.

## 2020-09-23 NOTE — PROGRESS NOTES
CC: Acanthamoeba keratitis    Referring provider: Dr. 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 Patient reports that she noticed redness of the eye starting 10 days ago (Tues 8/18). It was painful but without significant discharge. She saw an optometrist on Monday 8/24 who noted epithelial staining that looked like a dendrite. She was started on Valtrex 500 TID and moxifloxacin. She was not improving, so she was referred to the cornea specialist Dr. Sarai Bassett who noted perineural infiltrates concerning for acanthamoeba and sent patient to [facility] same day for evaluation and confocal microscopy.    Patient is CL wearer. She has daily disposable CL's but will wear the lenses for multiple days at a time after they are placed in saline.     Interval Hx: Patient reports over the interval, everything stable.  Perhaps some more tearing, vision stable, pain stable (3/10 severity, constant). Ibuprofen helps. No new flashes, floaters, or diplopia. Has been approved for miltefosine.  Has not started taking.      POHx:  CTL wearer: Yes    Family hx of eye disease: No  Social Hx: (-) smoking, (social) EtOH, (-) illicit drugs    Meds:    Moxifloxacin BID  PHMB - started 8/28, increased to 0.4 mg/ml on 9/11 --- using q1 hour around the clock  Ibuprofen 600 mg Q6H   Tylenol 500mg prn   Prednisolone acetate Qday left eye     Surgical Hx:  None    A/P:  # Acanthamoeba keratitis, left eye  - symptoms began ~8/18, initially treated with Valtrex & moxifloxacin but had NOT received any topical steroids  - radial perineuritis evident on exam  - confocal 8/28 showing signet ring cells located in epithelium and superficial stroma, but did not appear in deeper stroma  - slit lamp photos taken upon presentation, but did not get saved unfortunately  - central 8 mm of epithelium debrided at slit lamp, and samples obtained for Acanthamoeba culture, KOH, gram  stain, and routine anaerobic and aerobic culture (8/28/20) - culture positive for Acanthamoeba, otherwise only grew P acnes in broth only (unlikely pathogenic)  - started q1 hour PHMB on 8/28/20 (initially q1 hour while awake only as instructed, then around the clock starting 9/9  - 9/11: suspect initial scraping relieved active acanthamoeba trophozoites and subsequent worsening vision and pain due to encysted parasites waking up with robust inflammatory response as Acanthamoeba are dying VERSUS worsening of infection/ possible inadequate penetration of PHMB. Therefore increased PHMB concentration and started steroid  - Discussed if improving pain it is likely due to steroid and not necessarily improving infection, therefore need ongoing close follow-up.  - 9/14: Epi defect HEALED. Vision improved. Infiltrate improved. Redness improved.  - 9/23: Epi defect remains healed. Vision stable. Infiltrate again improved.   - Meds:    - PHMB 0.4 mg/ml -- tolerating increased concentration fine. Continue q1h while awake, discontinue for 8 hours overnight.  (ordered to [facility] on 9/11/2020 with 3 refills remain as of 9/14/2020)   - PF once daily   - Has been approved for miltefosine --- will hold for now.    - Continue moxifloxacin BID   - Artificial tears every 2-3 hours   - Photos today to monitor progression.   - For pain - ibuprofen 600 mg every 6 hours, alternate with Tylenol (pt reports she had limited renal eval for possible kidney donation for her father. She was told her kidney is large, but renal function is normal, and she has not been told to avoid renally excreted medications)  - discussed warning sign's and symptoms to call ASAP  - discussed importance of adhering to good CL hygiene to avoid infection in future  - Disc possible need for addition of chlorhexadine or voriconizol gtts/oral.   - limit ice pack use  - cold temp drives amoeba deeper into cornea.    # Allergic conjunctivitis, both eyes  - pataday prn  - ok  to use Flonase     Follow up: 9/28 as scheduled.     Jason Goldberg, MD  Cornea & External Disease Fellow  Department of Ophthalmology and Visual Neurosciences    Attending Physician Attestation:  Complete documentation of historical and exam elements from today's encounter can be found in the full encounter summary report (not reduplicated in this progress note).  I personally obtained the chief complaint(s) and history of present illness.  I confirmed and edited as necessary the review of systems, past medical/surgical history, family history, social history, and examination findings as documented by others; and I examined the patient myself.  I personally reviewed the relevant tests, images, and reports as documented above.  I formulated and edited as necessary the assessment and plan and discussed the findings and management plan with the patient and family. I personally reviewed the ophthalmic test(s) associated with this encounter, agree with the interpretation(s) as documented by the resident/fellow, and have edited the corresponding report(s) as necessary. - Jose G Chand MD    I personally spent great than 40min with the patient, of which >50% of the time was spent face to face with the patient, counseling and coordinating care with the patient. We discussed the complexity of her diagnosis, the need for further information prior to proceeding with yet another surgery, and the unknown prognosis for the patient at this time.    Jose G Chand MD

## 2020-09-25 LAB
FUNGUS SPEC CULT: NORMAL
SPECIMEN SOURCE: NORMAL

## 2020-09-28 ENCOUNTER — OFFICE VISIT (OUTPATIENT)
Dept: OPHTHALMOLOGY | Facility: CLINIC | Age: 35
End: 2020-09-28
Attending: OPHTHALMOLOGY
Payer: COMMERCIAL

## 2020-09-28 DIAGNOSIS — H02.886 MEIBOMIAN GLAND DYSFUNCTION (MGD) OF BOTH EYES: ICD-10-CM

## 2020-09-28 DIAGNOSIS — B60.13 ACANTHAMOEBA KERATITIS: Primary | ICD-10-CM

## 2020-09-28 DIAGNOSIS — H02.883 MEIBOMIAN GLAND DYSFUNCTION (MGD) OF BOTH EYES: ICD-10-CM

## 2020-09-28 DIAGNOSIS — B60.13 ACANTHAMOEBA KERATITIS: ICD-10-CM

## 2020-09-28 PROCEDURE — 92285 EXTERNAL OCULAR PHOTOGRAPHY: CPT | Mod: ZF | Performed by: OPHTHALMOLOGY

## 2020-09-28 PROCEDURE — G0463 HOSPITAL OUTPT CLINIC VISIT: HCPCS | Mod: ZF

## 2020-09-28 ASSESSMENT — REFRACTION_WEARINGRX
OD_SPHERE: -3.25
OS_SPHERE: -4.00
OD_CYLINDER: SPHERE
OS_CYLINDER: +0.25
OS_AXIS: 081
SPECS_TYPE: SVL

## 2020-09-28 ASSESSMENT — CONF VISUAL FIELD
OS_NORMAL: 1
OD_NORMAL: 1

## 2020-09-28 ASSESSMENT — EXTERNAL EXAM - LEFT EYE: OS_EXAM: NORMAL

## 2020-09-28 ASSESSMENT — VISUAL ACUITY
OS_PH_CC: 20/60
OS_CC: 20/125
CORRECTION_TYPE: GLASSES
OD_CC: 20/20
METHOD: SNELLEN - LINEAR

## 2020-09-28 ASSESSMENT — TONOMETRY
OS_IOP_MMHG: 10
IOP_METHOD: ICARE
OD_IOP_MMHG: 15

## 2020-09-28 ASSESSMENT — SLIT LAMP EXAM - LIDS
COMMENTS: NORMAL
COMMENTS: NORMAL

## 2020-09-28 ASSESSMENT — EXTERNAL EXAM - RIGHT EYE: OD_EXAM: NORMAL

## 2020-09-28 NOTE — NURSING NOTE
Chief Complaints and History of Present Illnesses   Patient presents with     Follow Up     5 day follow up  Acanthamoeba keratitis, left eye     Chief Complaint(s) and History of Present Illness(es)     Follow Up     Laterality: left eye    Comments: 5 day follow up  Acanthamoeba keratitis, left eye              Comments     Pt state vision is about the same as last visit. Constant achy feeling in LE, improvement with IBU. Pt feels that she needs to use IBU all the time.  No flashes or floaters.  No redness or dryness.    CHRIS Wren September 28, 2020 12:37 PM

## 2020-09-28 NOTE — PROGRESS NOTES
CC: Acanthamoeba keratitis    Referring provider: Dr. 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 Patient reports that she noticed redness of the eye starting 10 days ago (Tues 8/18). It was painful but without significant discharge. She saw an optometrist on Monday 8/24 who noted epithelial staining that looked like a dendrite. She was started on Valtrex 500 TID and moxifloxacin. She was not improving, so she was referred to the cornea specialist Dr. Sarai Bassett who noted perineural infiltrates concerning for acanthamoeba and sent patient to [facility] same day for evaluation and confocal microscopy.    Patient is CL wearer. She has daily disposable CL's but will wear the lenses for multiple days at a time after they are placed in saline.     Interval Hx:   Patient reports over the interval, pain a little bit worse the last day or two.  Maybe some more swelling as well.  Vision stable.  Still using ibuprofen throughout the day. No new flashes, floaters, or diplopia. Has been approved for miltefosine.  Has not started taking.      POHx:  CTL wearer: Yes    Family hx of eye disease: No  Social Hx: (-) smoking, (social) EtOH, (-) illicit drugs    Meds:    Moxifloxacin BID  PHMB - started 8/28, increased to 0.4 mg/ml on 9/11 --- using q1 hour while awake   Ibuprofen 600 mg Q6H   Tylenol 500mg prn   Prednisolone acetate Qday left eye     Surgical Hx:  None    A/P:  # Acanthamoeba keratitis, left eye  - symptoms began ~8/18, initially treated with Valtrex & moxifloxacin but had NOT received any topical steroids  - radial perineuritis evident on exam  - confocal 8/28 showing signet ring cells located in epithelium and superficial stroma, but did not appear in deeper stroma  - slit lamp photos taken upon presentation, but did not get saved unfortunately  - central 8 mm of epithelium debrided at slit lamp, and samples obtained for Acanthamoeba  culture, KOH, gram stain, and routine anaerobic and aerobic culture (8/28/20) - culture positive for Acanthamoeba, otherwise only grew P acnes in broth only (unlikely pathogenic)  - started q1 hour PHMB on 8/28/20 (initially q1 hour while awake only as instructed, then around the clock starting 9/9  - 9/11: suspect initial scraping relieved active acanthamoeba trophozoites and subsequent worsening vision and pain due to encysted parasites waking up with robust inflammatory response as Acanthamoeba are dying VERSUS worsening of infection/ possible inadequate penetration of PHMB. Therefore increased PHMB concentration and started steroid  - Discussed if improving pain it is likely due to steroid and not necessarily improving infection, therefore need ongoing close follow-up.  - 9/14: Epi defect HEALED. Vision improved. Infiltrate improved. Redness improved.  - 9/23: Epi defect remains healed. Vision stable. Infiltrate again improved.     9/28: SLP today, exam essentially stable with central cellular infiltrate.    - Meds:    - PHMB 0.4 mg/ml -- tolerating increased concentration fine. Continue q1h while awake, discontinue for 8 hours overnight.  (ordered to [facility] on 9/11/2020 with 3 refills remain as of 9/14/2020)   - Increase PF to BID -- plan to taper down after next visit.    - Has been approved for miltefosine --- will hold for now.    - Continue moxifloxacin BID   - Artificial tears every 2-3 hours   - Photos today to monitor progression.   - For pain - ibuprofen 600 mg every 6 hours, alternate with Tylenol (pt reports she had limited renal eval for possible kidney donation for her father. She was told her kidney is large, but renal function is normal, and she has not been told to avoid renally excreted medications)  - discussed warning sign's and symptoms to call ASAP  - discussed importance of adhering to good CL hygiene to avoid infection in future  - Disc possible need for addition of chlorhexadine or  voriconizol gtts/oral.   - limit ice pack use  - cold temp drives amoeba deeper into cornea.    # Allergic conjunctivitis, both eyes  - pataday prn  - ok to use Flonase     #MGD each eye  - hold on warm compresses for now in setting of acanthamoeba keratitis.     Follow up: 10/9  as scheduled.  Slit lamp photos before exam.     Jason Goldberg, MD  Cornea & External Disease Fellow  Department of Ophthalmology and Visual Neurosciences    Attending Physician Attestation:  Complete documentation of historical and exam elements from today's encounter can be found in the full encounter summary report (not reduplicated in this progress note).  I personally obtained the chief complaint(s) and history of present illness.  I confirmed and edited as necessary the review of systems, past medical/surgical history, family history, social history, and examination findings as documented by others; and I examined the patient myself.  I personally reviewed the relevant tests, images, and reports as documented above.  I formulated and edited as necessary the assessment and plan and discussed the findings and management plan with the patient and family. I personally reviewed the ophthalmic test(s) associated with this encounter, agree with the interpretation(s) as documented by the resident/fellow, and have edited the corresponding report(s) as necessary. - Jose G Chand MD

## 2020-10-09 ENCOUNTER — OFFICE VISIT (OUTPATIENT)
Dept: OPHTHALMOLOGY | Facility: CLINIC | Age: 35
End: 2020-10-09
Attending: OPHTHALMOLOGY
Payer: COMMERCIAL

## 2020-10-09 DIAGNOSIS — B60.13 ACANTHAMOEBA KERATITIS: Primary | ICD-10-CM

## 2020-10-09 DIAGNOSIS — H04.122 DRY EYE OF LEFT SIDE: ICD-10-CM

## 2020-10-09 DIAGNOSIS — B60.13 ACANTHAMOEBA KERATITIS: ICD-10-CM

## 2020-10-09 DIAGNOSIS — H02.886 MEIBOMIAN GLAND DYSFUNCTION (MGD) OF BOTH EYES: ICD-10-CM

## 2020-10-09 DIAGNOSIS — H02.883 MEIBOMIAN GLAND DYSFUNCTION (MGD) OF BOTH EYES: ICD-10-CM

## 2020-10-09 PROCEDURE — 99213 OFFICE O/P EST LOW 20 MIN: CPT | Performed by: OPHTHALMOLOGY

## 2020-10-09 PROCEDURE — 92285 EXTERNAL OCULAR PHOTOGRAPHY: CPT | Performed by: OPHTHALMOLOGY

## 2020-10-09 PROCEDURE — G0463 HOSPITAL OUTPT CLINIC VISIT: HCPCS

## 2020-10-09 ASSESSMENT — TONOMETRY
IOP_METHOD: ICARE
OS_IOP_MMHG: 10
OD_IOP_MMHG: 17

## 2020-10-09 ASSESSMENT — REFRACTION_WEARINGRX
OD_CYLINDER: SPHERE
OS_CYLINDER: +0.25
OS_SPHERE: -4.00
OD_SPHERE: -3.25
SPECS_TYPE: SVL
OS_AXIS: 081

## 2020-10-09 ASSESSMENT — SLIT LAMP EXAM - LIDS
COMMENTS: NORMAL
COMMENTS: NORMAL

## 2020-10-09 ASSESSMENT — VISUAL ACUITY
METHOD: SNELLEN - LINEAR
OS_PH_CC: 20/50
OD_CC: 20/20
OS_CC: 20/60
CORRECTION_TYPE: GLASSES
OS_PH_CC+: -1

## 2020-10-09 ASSESSMENT — CONF VISUAL FIELD
OD_NORMAL: 1
METHOD: COUNTING FINGERS
OS_NORMAL: 1

## 2020-10-09 ASSESSMENT — EXTERNAL EXAM - RIGHT EYE: OD_EXAM: NORMAL

## 2020-10-09 ASSESSMENT — EXTERNAL EXAM - LEFT EYE: OS_EXAM: NORMAL

## 2020-10-09 NOTE — NURSING NOTE
Chief Complaints and History of Present Illnesses   Patient presents with     Follow Up     1.5 week follow up Acanthamoeba keratitis, left eye     Chief Complaint(s) and History of Present Illness(es)     Follow Up     Laterality: left eye    Quality: blurred vision    Course: stable    Associated symptoms: Negative for dryness, eye pain, discharge and tearing    Pain scale: 0/10    Comments: 1.5 week follow up Acanthamoeba keratitis, left eye              Comments     Pt denies any significant vision changes in either eye since last visit.  Denies any flashes, floaters, pain, pressure, irritation, discharge, and tearing.  Ocular Meds: Moxifloxacin BID LE (misses drops sometimes) , PHMB q1h while awake LE,  Prednisolone BID LE    Adina Slater OT 10:01 AM October 9, 2020

## 2020-10-09 NOTE — PROGRESS NOTES
CC: Acanthamoeba keratitis    Referring provider: Dr. 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 Patient reports that she noticed redness of the eye starting 10 days ago (Tues 8/18). It was painful but without significant discharge. She saw an optometrist on Monday 8/24 who noted epithelial staining that looked like a dendrite. She was started on Valtrex 500 TID and moxifloxacin. She was not improving, so she was referred to the cornea specialist Dr. Sarai Bassett who noted perineural infiltrates concerning for acanthamoeba and sent patient to [facility] same day for evaluation and confocal microscopy.    Patient is CL wearer. She has daily disposable CL's but will wear the lenses for multiple days at a time after they are placed in saline.     Interval Hx:   Patient reports over the interval, pain better after starting PF BID.  Has been able to stop taking ibuprofen, able to start work again.  Vision about the same, maybe a little better. No new flashes, floaters, or diplopia. Has been approved for miltefosine.  Has not started taking.      POHx:  CTL wearer: Yes    Family hx of eye disease: No  Social Hx: (-) smoking, (social) EtOH, (-) illicit drugs    Meds:    Moxifloxacin BID  PHMB - started 8/28, increased to 0.4 mg/ml on 9/11 --- using q1 hour while awake   Ibuprofen 600 mg Q6H   Tylenol 500mg prn   Prednisolone acetate BID  left eye     Surgical Hx:  None    A/P:  # Acanthamoeba keratitis, left eye  - symptoms began ~8/18, initially treated with Valtrex & moxifloxacin but had NOT received any topical steroids  - radial perineuritis evident on exam  - confocal 8/28 showing signet ring cells located in epithelium and superficial stroma, but did not appear in deeper stroma  - slit lamp photos taken upon presentation, but did not get saved unfortunately  - central 8 mm of epithelium debrided at slit lamp, and samples obtained for  Acanthamoeba culture, KOH, gram stain, and routine anaerobic and aerobic culture (8/28/20) - culture positive for Acanthamoeba, otherwise only grew P acnes in broth only (unlikely pathogenic)  - started q1 hour PHMB on 8/28/20 (initially q1 hour while awake only as instructed, then around the clock starting 9/9  - 9/11: suspect initial scraping relieved active acanthamoeba trophozoites and subsequent worsening vision and pain due to encysted parasites waking up with robust inflammatory response as Acanthamoeba are dying VERSUS worsening of infection/ possible inadequate penetration of PHMB. Therefore increased PHMB concentration and started steroid  - Discussed if improving pain it is likely due to steroid and not necessarily improving infection, therefore need ongoing close follow-up.  - 9/14: Epi defect HEALED. Vision improved. Infiltrate improved. Redness improved.  - 9/23: Epi defect remains healed. Vision stable. Infiltrate again improved.   9/28: SLP today, exam essentially stable with central cellular infiltrate.  10/9:  Improved symptoms on PF BID     - Meds:    - PHMB 0.4 mg/ml -- tolerating increased concentration fine. Continue q1h while awake, discontinue for 8 hours overnight.  (ordered to [facility] on 9/11/2020 with 3 refills remain as of 9/14/2020)   - taper PF to qday x 1 week, then STOP   - Has been approved for miltefosine --- will hold for now.    - STOP moxifloxacin   - taper PHMB to Q2H OS   - Artificial tears every 2-3 hours   - Photos today to monitor progression.   - discussed warning sign's and symptoms to call ASAP   - discussed importance of adhering to good CL hygiene to avoid infection in future   - Disc possible need for addition of chlorhexadine or voriconizol gtts/oral.    - limit ice pack use  - cold temp drives amoeba deeper into cornea.    # Allergic conjunctivitis, both eyes   - pataday prn   - ok to use Flonase     #MGD each eye   - hold on warm compresses for now in setting of  acanthamoeba keratitis.     Follow up: 2 weeks, sooner PRN.  Slit lamp photos LE before exam.    Attending Physician Attestation:  Complete documentation of historical and exam elements from today's encounter can be found in the full encounter summary report (not reduplicated in this progress note).  I personally obtained the chief complaint(s) and history of present illness.  I confirmed and edited as necessary the review of systems, past medical/surgical history, family history, social history, and examination findings as documented by others; and I examined the patient myself.  I personally reviewed the relevant tests, images, and reports as documented above.  I formulated and edited as necessary the assessment and plan and discussed the findings and management plan with the patient and family. I personally reviewed the ophthalmic test(s) associated with this encounter, agree with the interpretation(s) as documented by the resident/fellow, and have edited the corresponding report(s) as necessary. - Jose G Chand MD

## 2020-10-24 DIAGNOSIS — B60.13 ACANTHAMOEBA KERATITIS: ICD-10-CM

## 2020-10-24 NOTE — TELEPHONE ENCOUNTER
polyhexamethylene biguanide 0.2mg/mL (BAQUACIL) 0.2mg/ml compounded ophthalmic solution    Last Written Prescription Date:  8/28/20  Last Fill Quantity: 15ml,   # refills: 3  Last Office Visit : 10/9/20  Future Office visit:  10/26/20    PHMB - started 8/28, increased to 0.4 mg/ml on 9/11 --- using q1 hour while awake   started q1 hour PHMB on 8/28/20 (initially q1 hour while awake only as instructed, then around the clock starting 9/9   PHMB 0.4 mg/ml -- tolerating increased concentration fine. Continue q1h while awake, discontinue for 8 hours overnight.  (ordered to [facility] on 9/11/2020 with 3 refills remain as of 9/14/2020)   taper PHMB to Q2H OS    Routing refill request to provider for review/approval because: please clarify dosing instructions. See above note exerpts. No order 9/11/20 to [facility]  on med list.

## 2020-10-26 ENCOUNTER — OFFICE VISIT (OUTPATIENT)
Dept: OPHTHALMOLOGY | Facility: CLINIC | Age: 35
End: 2020-10-26
Attending: OPHTHALMOLOGY
Payer: COMMERCIAL

## 2020-10-26 DIAGNOSIS — H02.883 MEIBOMIAN GLAND DYSFUNCTION (MGD) OF BOTH EYES: ICD-10-CM

## 2020-10-26 DIAGNOSIS — B60.13 ACANTHAMOEBA KERATITIS: ICD-10-CM

## 2020-10-26 DIAGNOSIS — H18.20 CORNEAL EDEMA OF LEFT EYE: ICD-10-CM

## 2020-10-26 DIAGNOSIS — B60.13 ACANTHAMOEBA KERATITIS: Primary | ICD-10-CM

## 2020-10-26 DIAGNOSIS — H04.122 DRY EYE OF LEFT SIDE: ICD-10-CM

## 2020-10-26 DIAGNOSIS — H02.886 MEIBOMIAN GLAND DYSFUNCTION (MGD) OF BOTH EYES: ICD-10-CM

## 2020-10-26 PROCEDURE — 99215 OFFICE O/P EST HI 40 MIN: CPT | Mod: GC | Performed by: OPHTHALMOLOGY

## 2020-10-26 PROCEDURE — G0463 HOSPITAL OUTPT CLINIC VISIT: HCPCS

## 2020-10-26 PROCEDURE — 92285 EXTERNAL OCULAR PHOTOGRAPHY: CPT | Performed by: OPHTHALMOLOGY

## 2020-10-26 ASSESSMENT — TONOMETRY
OS_IOP_MMHG: 10
IOP_METHOD: ICARE
OD_IOP_MMHG: 15

## 2020-10-26 ASSESSMENT — REFRACTION_WEARINGRX
OD_CYLINDER: SPHERE
OS_AXIS: 081
SPECS_TYPE: SVL
OS_CYLINDER: +0.25
OD_SPHERE: -3.25
OS_SPHERE: -4.00

## 2020-10-26 ASSESSMENT — VISUAL ACUITY
OD_CC: 20/20
CORRECTION_TYPE: GLASSES
OS_CC+: +2
METHOD: SNELLEN - LINEAR
OS_PH_CC: 20/40
OS_CC: 20/50

## 2020-10-26 ASSESSMENT — SLIT LAMP EXAM - LIDS
COMMENTS: NORMAL
COMMENTS: NORMAL

## 2020-10-26 ASSESSMENT — CONF VISUAL FIELD
OS_NORMAL: 1
OD_NORMAL: 1

## 2020-10-26 ASSESSMENT — EXTERNAL EXAM - LEFT EYE: OS_EXAM: NORMAL

## 2020-10-26 ASSESSMENT — EXTERNAL EXAM - RIGHT EYE: OD_EXAM: NORMAL

## 2020-10-26 NOTE — PROGRESS NOTES
"CC: Acanthamoeba keratitis    Referring provider: Dr. 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 Patient reports that she noticed redness of the eye starting 10 days ago (Tues 8/18). It was painful but without significant discharge. She saw an optometrist on Monday 8/24 who noted epithelial staining that looked like a dendrite. She was started on Valtrex 500 TID and moxifloxacin. She was not improving, so she was referred to the cornea specialist Dr. Sarai Bassett who noted perineural infiltrates concerning for acanthamoeba and sent patient to [facility] same day for evaluation and confocal microscopy.    Patient is CL wearer. She has daily disposable CL's but will wear the lenses for multiple days at a time after they are placed in saline.     Interval Hx:   2 week follow up for acanthamoeba. Patient reports things are \"the same\" since her last visit. SHe is not having any pain but occasionally can \"feel my eye\" that is intermittent. Associated itching. Vision is stable - still intermittently foggy. No clear trigger for fogginess.     POHx:  CTL wearer: Yes    Family hx of eye disease: No  Social Hx: (-) smoking, (social) EtOH, (-) illicit drugs    Meds:      PHMB - started 8/28, increased to 0.4 mg/ml on 9/11 --- using q1 hour while awake     Prednisolone acetate QDay left eye - stopped 10/26/20   Surgical Hx:  None    A/P:  # Acanthamoeba keratitis, left eye  - symptoms began ~8/18, initially treated with Valtrex & moxifloxacin but had NOT received any topical steroids  - radial perineuritis evident on exam  - confocal 8/28 showing signet ring cells located in epithelium and superficial stroma, but did not appear in deeper stroma  - slit lamp photos taken upon presentation, but did not get saved unfortunately  - central 8 mm of epithelium debrided at slit lamp, and samples obtained for Acanthamoeba culture, KOH, gram stain, and " routine anaerobic and aerobic culture (8/28/20) - culture positive for Acanthamoeba, otherwise only grew P acnes in broth only (unlikely pathogenic)  - started q1 hour PHMB on 8/28/20 (initially q1 hour while awake only as instructed, then around the clock starting 9/9  - 9/11: suspect initial scraping relieved active acanthamoeba trophozoites and subsequent worsening vision and pain due to encysted parasites waking up with robust inflammatory response as Acanthamoeba are dying VERSUS worsening of infection/ possible inadequate penetration of PHMB. Therefore increased PHMB concentration and started steroid  - Discussed if improving pain it is likely due to steroid and not necessarily improving infection, therefore need ongoing close follow-up.  - 9/14: Epi defect HEALED. Vision improved. Infiltrate improved. Redness improved.  - 9/23: Epi defect remains healed. Vision stable. Infiltrate again improved.   9/28: Exam essentially stable with central cellular infiltrate.  10/9:  Improved symptoms on PF BID   10/26: Exam stable, forming a scar, vision continues to improve    - Meds:    - PHMB 0.4 mg/ml -- Decrease to Q3H OD   - D/C Prednisolone     - Has been approved for miltefosine --- will hold for now.    - Artificial tears every 2-3 hours   - Photos today to monitor progression.   - discussed warning sign's and symptoms to call ASAP   - discussed importance of adhering to good CL hygiene to avoid infection in future   - Disc possible need for addition of chlorhexadine or voriconizol gtts/oral.    - limit ice pack use  - cold temp drives amoeba deeper into cornea.   - discussed possible PTK OS in the future for cornea scarring (discussed possible hyperopic shift)    # Allergic conjunctivitis, both eyes   - pataday prn   - ok to use Flonase     #MGD each eye   - hold on warm compresses for now in setting of acanthamoeba keratitis.     Follow up: 2 weeks, sooner PRN.  Slit lamp photos LE before exam.    Gerson Renteria,  MD  Ophthalmology PGY-3  [facility]    Attending Physician Attestation:  Complete documentation of historical and exam elements from today's encounter can be found in the full encounter summary report (not reduplicated in this progress note).  I personally obtained the chief complaint(s) and history of present illness.  I confirmed and edited as necessary the review of systems, past medical/surgical history, family history, social history, and examination findings as documented by others; and I examined the patient myself.  I personally reviewed the relevant tests, images, and reports as documented above.  I formulated and edited as necessary the assessment and plan and discussed the findings and management plan with the patient and family. I personally reviewed the ophthalmic test(s) associated with this encounter, agree with the interpretation(s) as documented by the resident/fellow, and have edited the corresponding report(s) as necessary. - Jose G Chand MD    I personally spent great than 40min with the patient, of which >50% of the time was spent face to face with the patient, counseling and coordinating care with the patient. We discussed the complexity of her diagnosis, the need for further information prior to proceeding with yet another surgery, and the unknown prognosis for the patient at this time.    Jose G Chand MD

## 2020-10-26 NOTE — NURSING NOTE
Chief Complaints and History of Present Illnesses   Patient presents with     Follow Up     Chief Complaint(s) and History of Present Illness(es)     Follow Up     Laterality: left eye    Associated symptoms: Negative for photophobia and tearing    Pain scale: 0/10              Comments     Follow up for Acanthamoeba left eye.  The patient notes improved symptoms.  The patient is using PHMB q2h waking hours and Prednisolone once daily in the left eye.  Claritza Loyd, COA, COA 9:30 AM 10/26/2020

## 2020-11-02 ENCOUNTER — TELEPHONE (OUTPATIENT)
Dept: OPHTHALMOLOGY | Facility: CLINIC | Age: 35
End: 2020-11-02

## 2020-11-02 ENCOUNTER — OFFICE VISIT (OUTPATIENT)
Dept: OPHTHALMOLOGY | Facility: CLINIC | Age: 35
End: 2020-11-02
Attending: OPHTHALMOLOGY
Payer: COMMERCIAL

## 2020-11-02 DIAGNOSIS — B60.13 ACANTHAMOEBA KERATITIS: Primary | ICD-10-CM

## 2020-11-02 DIAGNOSIS — B60.13 ACANTHAMOEBA KERATITIS: ICD-10-CM

## 2020-11-02 PROBLEM — F90.9 ATTENTION DEFICIT HYPERACTIVITY DISORDER (ADHD): Status: ACTIVE | Noted: 2020-11-02

## 2020-11-02 PROCEDURE — 92285 EXTERNAL OCULAR PHOTOGRAPHY: CPT | Performed by: OPHTHALMOLOGY

## 2020-11-02 PROCEDURE — 99213 OFFICE O/P EST LOW 20 MIN: CPT | Performed by: OPHTHALMOLOGY

## 2020-11-02 PROCEDURE — G0463 HOSPITAL OUTPT CLINIC VISIT: HCPCS

## 2020-11-02 ASSESSMENT — SLIT LAMP EXAM - LIDS
COMMENTS: NORMAL
COMMENTS: NORMAL

## 2020-11-02 ASSESSMENT — TONOMETRY
OD_IOP_MMHG: 15
IOP_METHOD: ICARE
OS_IOP_MMHG: 7

## 2020-11-02 ASSESSMENT — CONF VISUAL FIELD
METHOD: COUNTING FINGERS
OD_NORMAL: 1
OS_NORMAL: 1

## 2020-11-02 ASSESSMENT — EXTERNAL EXAM - RIGHT EYE: OD_EXAM: NORMAL

## 2020-11-02 ASSESSMENT — VISUAL ACUITY
METHOD: SNELLEN - LINEAR
OS_CC: 20/70
OD_CC: 20/20
CORRECTION_TYPE: GLASSES
OS_CC+: +1
OS_PH_CC: 20/50

## 2020-11-02 ASSESSMENT — EXTERNAL EXAM - LEFT EYE: OS_EXAM: NORMAL

## 2020-11-02 NOTE — TELEPHONE ENCOUNTER
[facility] Call Center    Phone Message    May a detailed message be left on voicemail: yes     Reason for Call: Other:   Pt says 100% of her symptoms in her left eye has returned. Pt is practically blind. Please call pt back to advise.     Action Taken: Other:  eye    Travel Screening: Not Applicable

## 2020-11-02 NOTE — NURSING NOTE
Chief Complaints and History of Present Illnesses   Patient presents with     Follow Up     Acanthamoeba of LE recheck     Chief Complaint(s) and History of Present Illness(es)     Follow Up     Laterality: left eye    Onset: gradual    Onset: 3 days ago    Quality: blurred vision and hazy    Severity: moderate    Frequency: constantly    Timing: throughout the day    Course: gradually worsening    Associated symptoms: eye pain, tearing, photophobia and burning    Treatments tried: artificial tears    Pain scale: 2/10    Comments: Acanthamoeba of LE recheck              Comments     Burning pain, photophobia, reduced VA, redness and watering of LE since last Friday and have been progressing ever since. Steroids were stopped at last visit.  PHMB every hour while awake to LE.  PFAT about BID.      CHASE Rivera COT 2:39 PM 11/02/2020

## 2020-11-02 NOTE — TELEPHONE ENCOUNTER
[facility] Call Center    Phone Message    May a detailed message be left on voicemail: yes     Reason for Call: Other: Pt called to say her symptoms are back, she was told to call back if they did , Please call Pt back to discuss  Thank you,    Action Taken: Message routed to:  [facility]): eye    Travel Screening: Not Applicable

## 2020-11-02 NOTE — TELEPHONE ENCOUNTER
I spoke to the patient who notes worsening symptoms of her left eye.  She notes increased blurred vision and vision loss.  I scheduled her for today.

## 2020-11-02 NOTE — PROGRESS NOTES
CC: Acanthamoeba keratitis    Referring provider: Dr. 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 Patient reports that she noticed redness of the eye starting 10 days ago (Tues 8/18). It was painful but without significant discharge. She saw an optometrist on Monday 8/24 who noted epithelial staining that looked like a dendrite. She was started on Valtrex 500 TID and moxifloxacin. She was not improving, so she was referred to the cornea specialist Dr. Sarai Bassett who noted perineural infiltrates concerning for acanthamoeba and sent patient to [facility] same day for evaluation and confocal microscopy.    Patient is CL wearer. She has daily disposable CL's but will wear the lenses for multiple days at a time after they are placed in saline.     Interval Hx:   Unscheduled fuv for worsening pain. She stopped PF last Monday (one week ago), and on Friday developed worsening pain, redness, and vision. She self titrated PHMB up to q1hour which hasn't helped much. Eye remains red and irritated.     POHx:  CTL wearer: Yes    Family hx of eye disease: No  Social Hx: (-) smoking, (social) EtOH, (-) illicit drugs    Meds:      PHMB - started 8/28, increased to 0.4 mg/ml on 9/11 --- using q1 hour while awake     Prednisolone acetate QDay left eye - stopped 10/26/20.   Surgical Hx:  None    A/P:  # Acanthamoeba keratitis, left eye  - symptoms began ~8/18, initially treated with Valtrex & moxifloxacin but had NOT received any topical steroids  - radial perineuritis evident on exam  - confocal 8/28 showing signet ring cells located in epithelium and superficial stroma, but did not appear in deeper stroma  - slit lamp photos taken upon presentation, but did not get saved unfortunately  - central 8 mm of epithelium debrided at slit lamp, and samples obtained for Acanthamoeba culture, KOH, gram stain, and routine anaerobic and aerobic culture (8/28/20) -  culture positive for Acanthamoeba, otherwise only grew P acnes in broth only (unlikely pathogenic)  - started q1 hour PHMB on 8/28/20 (initially q1 hour while awake only as instructed, then around the clock starting 9/9  - 9/11: suspect initial scraping relieved active acanthamoeba trophozoites and subsequent worsening vision and pain due to encysted parasites waking up with robust inflammatory response as Acanthamoeba are dying VERSUS worsening of infection/ possible inadequate penetration of PHMB. Therefore increased PHMB concentration and started steroid  - Discussed if improving pain it is likely due to steroid and not necessarily improving infection, therefore need ongoing close follow-up.  - 9/14: Epi defect HEALED. Vision improved. Infiltrate improved. Redness improved.  - 9/23: Epi defect remains healed. Vision stable. Infiltrate again improved.   9/28: Exam essentially stable with central cellular infiltrate.  10/9:  Improved symptoms on PF BID   10/26: Exam stable, forming a scar, vision continues to improve  10/30: Noted worsening redness, pain, and vision. Self increased PHMB back to q1h while awake.   - 11/2: New cell in AC. Infiltrate stable. No epi defect. 3+ PEE.    - Meds:    - PHMB 0.4 mg/ml- continue back at q1hour while awake   - Remain off Prednisolone   - Begin lubricating ointment QHS     - Has been approved for miltefosine --- will hold for now.    - Artificial tears every 2-3 hours   - Photos today to monitor progression.   - discussed warning sign's and symptoms to call ASAP   - discussed importance of adhering to good CL hygiene to avoid infection in future   - Disc possible need for addition of chlorhexadine or voriconizol gtts/oral.    - limit ice pack use  - cold temp drives amoeba deeper into cornea.   - discussed possible PTK OS in the future for cornea scarring (discussed possible hyperopic shift)    # Allergic conjunctivitis, both eyes   - pataday prn   - ok to use Flonase     #MGD  each eye   - hold on warm compresses for now in setting of acanthamoeba keratitis.     Follow up: 1 week    Alicia Guido MD  Cornea & External Disease Fellow    Attending Physician Attestation:  Complete documentation of historical and exam elements from today's encounter can be found in the full encounter summary report (not reduplicated in this progress note).  I personally obtained the chief complaint(s) and history of present illness.  I confirmed and edited as necessary the review of systems, past medical/surgical history, family history, social history, and examination findings as documented by others; and I examined the patient myself.  I personally reviewed the relevant tests, images, and reports as documented above.  I formulated and edited as necessary the assessment and plan and discussed the findings and management plan with the patient and family. I personally reviewed the ophthalmic test(s) associated with this encounter, agree with the interpretation(s) as documented by the resident/fellow, and have edited the corresponding report(s) as necessary. - Jose G Chand MD

## 2020-11-08 ENCOUNTER — HEALTH MAINTENANCE LETTER (OUTPATIENT)
Age: 35
End: 2020-11-08

## 2020-11-11 ENCOUNTER — OFFICE VISIT (OUTPATIENT)
Dept: OPHTHALMOLOGY | Facility: CLINIC | Age: 35
End: 2020-11-11
Attending: OPHTHALMOLOGY
Payer: COMMERCIAL

## 2020-11-11 DIAGNOSIS — B60.13 ACANTHAMOEBA KERATITIS: ICD-10-CM

## 2020-11-11 DIAGNOSIS — H02.886 MEIBOMIAN GLAND DYSFUNCTION (MGD) OF BOTH EYES: ICD-10-CM

## 2020-11-11 DIAGNOSIS — B60.13 ACANTHAMOEBA KERATITIS: Primary | ICD-10-CM

## 2020-11-11 DIAGNOSIS — H04.122 DRY EYE OF LEFT SIDE: ICD-10-CM

## 2020-11-11 DIAGNOSIS — H18.20 CORNEAL EDEMA OF LEFT EYE: ICD-10-CM

## 2020-11-11 DIAGNOSIS — H02.883 MEIBOMIAN GLAND DYSFUNCTION (MGD) OF BOTH EYES: ICD-10-CM

## 2020-11-11 PROCEDURE — 92285 EXTERNAL OCULAR PHOTOGRAPHY: CPT | Performed by: OPHTHALMOLOGY

## 2020-11-11 PROCEDURE — 99213 OFFICE O/P EST LOW 20 MIN: CPT | Mod: GC | Performed by: OPHTHALMOLOGY

## 2020-11-11 PROCEDURE — G0463 HOSPITAL OUTPT CLINIC VISIT: HCPCS

## 2020-11-11 ASSESSMENT — CONF VISUAL FIELD
OD_NORMAL: 1
OS_NORMAL: 1

## 2020-11-11 ASSESSMENT — VISUAL ACUITY
OD_CC: 20/20
CORRECTION_TYPE: GLASSES
OS_CC: 20/60
OS_CC+: -1
METHOD: SNELLEN - LINEAR

## 2020-11-11 ASSESSMENT — REFRACTION_WEARINGRX
OD_SPHERE: -3.25
SPECS_TYPE: SVL
OS_AXIS: 081
OS_CYLINDER: +0.25
OS_SPHERE: -4.00
OD_CYLINDER: SPHERE

## 2020-11-11 ASSESSMENT — TONOMETRY
IOP_METHOD: TONOPEN
OS_IOP_MMHG: 15
OD_IOP_MMHG: 15

## 2020-11-11 ASSESSMENT — EXTERNAL EXAM - LEFT EYE: OS_EXAM: NORMAL

## 2020-11-11 ASSESSMENT — SLIT LAMP EXAM - LIDS
COMMENTS: NORMAL
COMMENTS: NORMAL

## 2020-11-11 ASSESSMENT — EXTERNAL EXAM - RIGHT EYE: OD_EXAM: NORMAL

## 2020-11-11 NOTE — NURSING NOTE
Chief Complaints and History of Present Illnesses   Patient presents with     Follow Up     F/u acanthamoeba keratitis of the left eye     Chief Complaint(s) and History of Present Illness(es)     Follow Up     Laterality: left eye    Onset: 9 days ago    Associated symptoms: photophobia (has improved).  Negative for eye pain, redness and tearing    Pain scale: 0/10    Comments: F/u acanthamoeba keratitis of the left eye              Comments     Pt states vision has improved over the past week    Ocular meds:  PHMB 0.4 mg/ml - q1hour while awake, left eye  Off Prednisolone (on hold)  Lubricating ointment at bedtime, left eye    CASEY Garrett 9:48 AM November 11, 2020

## 2020-11-11 NOTE — PROGRESS NOTES
CC: Acanthamoeba keratitis    Referring provider: Dr. 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 Patient reports that she noticed redness of the eye starting 10 days ago (Tues 8/18). It was painful but without significant discharge. She saw an optometrist on Monday 8/24 who noted epithelial staining that looked like a dendrite. She was started on Valtrex 500 TID and moxifloxacin. She was not improving, so she was referred to the cornea specialist Dr. Sarai Bassett who noted perineural infiltrates concerning for acanthamoeba and sent patient to [facility] same day for evaluation and confocal microscopy.    Patient is CL wearer. She has daily disposable CL's but will wear the lenses for multiple days at a time after they are placed in saline.     Interval Hx:   Pt much better. Pain improved. Vision improved/stable. Has photophobia when driving only and this is helped with sunglasses. Otherwise no worsening of injection, discharge. No new flashes/floaters.    POHx:  CTL wearer: Yes    Family hx of eye disease: No  Social Hx: (-) smoking, (social) EtOH, (-) illicit drugs    Meds:      PHMB - started 8/28, increased to 0.4 mg/ml on 9/11 --- using q1 hour while awake     Prednisolone acetate QDay left eye - stopped 10/26/20.   Surgical Hx:  None    A/P:  # Acanthamoeba keratitis, left eye  - symptoms began ~8/18, initially treated with Valtrex & moxifloxacin but had NOT received any topical steroids  - radial perineuritis evident on exam  - confocal 8/28 showing signet ring cells located in epithelium and superficial stroma, but did not appear in deeper stroma  - slit lamp photos taken upon presentation, but did not get saved unfortunately  - central 8 mm of epithelium debrided at slit lamp, and samples obtained for Acanthamoeba culture, KOH, gram stain, and routine anaerobic and aerobic culture (8/28/20) - culture positive for Acanthamoeba,  S: 4:03PM- Pt is a 45 yrs old male in the [place] ED for alcohol dependence and depression, report by Anny.      B: Pt presented to the ED feeling depressed and hopeless.  Pt reports that he has not slept or eat for 2 days.  Pt has a hx of alcohol dependence and depression. There is no hx of seizures.  Pt reports drinking at least 15 beers/day and smokes THC.  Pt had 2 beers today.  Pt reports that he does not drive and is unable to manage his life.   He drinks to deal with Anxiety.  Pt feels hopeless and helpless and does not like the life he has presently.  He's had multiple CD treatments; was last in [facility] last summer. Pt is dx with Atrial fibrillation.  He has chronic backpain.  Pt is medically cleared and ambulates independently.   EKG is normal.  Utox is pos for Cannabinoids(Marijuana) and ETOH.  Glucose: 104, AST: 54, Pt's breathalyzer is 0.047. Labs are normal.     A: Vol.   rec' for MICD placement.     R: 5:25PM- Dr. Kohler accepted for 3A/[place].  MICD.  Unit and ED notified.  Text page sent to ED at 541PM.          Patient cleared and ready for behavioral bed placement: Yes       otherwise only grew P acnes in broth only (unlikely pathogenic)  - started q1 hour PHMB on 8/28/20 (initially q1 hour while awake only as instructed, then around the clock starting 9/9  - 9/11: suspect initial scraping relieved active acanthamoeba trophozoites and subsequent worsening vision and pain due to encysted parasites waking up with robust inflammatory response as Acanthamoeba are dying VERSUS worsening of infection/ possible inadequate penetration of PHMB. Therefore increased PHMB concentration and started steroid  - Discussed if improving pain it is likely due to steroid and not necessarily improving infection, therefore need ongoing close follow-up.  - 9/14: Epi defect HEALED. Vision improved. Infiltrate improved. Redness improved.  - 9/23: Epi defect remains healed. Vision stable. Infiltrate again improved.   9/28: Exam essentially stable with central cellular infiltrate.  10/9:  Improved symptoms on PF BID   10/26: Exam stable, forming a scar, vision continues to improve. STOPPED Pred.  10/30: Noted worsening redness, pain, and vision. Self increased PHMB back to q1h while awake.   - 11/2: New cell in AC. Infiltrate stable. No epi defect. 3+ PEE.  - 11/11: AC cell resolved. Scar stable.       - Meds:    - PHMB 0.4 mg/ml- decrease to q2h   - Continue ointment QHS     - Has been approved for miltefosine --- will hold for now.    - Artificial tears every 2-3 hours   - Photos today to monitor progression.   - discussed warning sign's and symptoms to call ASAP   - discussed importance of adhering to good CL hygiene to avoid infection in future   - Disc possible need for addition of chlorhexadine or voriconazole gtts/oral.    - limit ice pack use  - cold temp drives amoeba deeper into cornea.   - discussed possible PTK OS in the future for cornea scarring (discussed possible hyperopic shift)    # Allergic conjunctivitis, both eyes   - pataday prn   - ok to use Flonase     #MGD each eye   - hold on warm  compresses for now in setting of acanthamoeba keratitis.     Follow up: 2 weeks    Alicia Guido MD  Cornea & External Disease Fellow    Attending Physician Attestation:  Complete documentation of historical and exam elements from today's encounter can be found in the full encounter summary report (not reduplicated in this progress note).  I personally obtained the chief complaint(s) and history of present illness.  I confirmed and edited as necessary the review of systems, past medical/surgical history, family history, social history, and examination findings as documented by others; and I examined the patient myself.  I personally reviewed the relevant tests, images, and reports as documented above.  I formulated and edited as necessary the assessment and plan and discussed the findings and management plan with the patient and family. I personally reviewed the ophthalmic test(s) associated with this encounter, agree with the interpretation(s) as documented by the resident/fellow, and have edited the corresponding report(s) as necessary. - Jose G Chand MD

## 2020-11-25 ENCOUNTER — OFFICE VISIT (OUTPATIENT)
Dept: OPHTHALMOLOGY | Facility: CLINIC | Age: 35
End: 2020-11-25
Attending: OPHTHALMOLOGY
Payer: COMMERCIAL

## 2020-11-25 DIAGNOSIS — H02.886 MEIBOMIAN GLAND DYSFUNCTION (MGD) OF BOTH EYES: ICD-10-CM

## 2020-11-25 DIAGNOSIS — H18.20 CORNEAL EDEMA OF LEFT EYE: ICD-10-CM

## 2020-11-25 DIAGNOSIS — B60.13 ACANTHAMOEBA KERATITIS: Primary | ICD-10-CM

## 2020-11-25 DIAGNOSIS — B60.13 ACANTHAMOEBA KERATITIS: ICD-10-CM

## 2020-11-25 DIAGNOSIS — H04.122 DRY EYE OF LEFT SIDE: ICD-10-CM

## 2020-11-25 DIAGNOSIS — H02.883 MEIBOMIAN GLAND DYSFUNCTION (MGD) OF BOTH EYES: ICD-10-CM

## 2020-11-25 PROCEDURE — 99213 OFFICE O/P EST LOW 20 MIN: CPT | Mod: GC | Performed by: OPHTHALMOLOGY

## 2020-11-25 PROCEDURE — G0463 HOSPITAL OUTPT CLINIC VISIT: HCPCS

## 2020-11-25 PROCEDURE — 92285 EXTERNAL OCULAR PHOTOGRAPHY: CPT | Performed by: OPHTHALMOLOGY

## 2020-11-25 ASSESSMENT — VISUAL ACUITY
CORRECTION_TYPE: GLASSES
METHOD: SNELLEN - LINEAR
OS_PH_CC: 20/30
OD_CC: 20/20
OS_CC: 20/50
OS_CC+: -2
OS_PH_CC+: -1

## 2020-11-25 ASSESSMENT — EXTERNAL EXAM - LEFT EYE: OS_EXAM: NORMAL

## 2020-11-25 ASSESSMENT — TONOMETRY
OS_IOP_MMHG: 10
IOP_METHOD: ICARE
OD_IOP_MMHG: 15

## 2020-11-25 ASSESSMENT — REFRACTION_WEARINGRX
SPECS_TYPE: SVL
OS_SPHERE: -4.00
OD_SPHERE: -3.25
OS_AXIS: 081
OS_CYLINDER: +0.25
OD_CYLINDER: SPHERE

## 2020-11-25 ASSESSMENT — SLIT LAMP EXAM - LIDS
COMMENTS: NORMAL
COMMENTS: NORMAL

## 2020-11-25 ASSESSMENT — EXTERNAL EXAM - RIGHT EYE: OD_EXAM: NORMAL

## 2020-11-25 ASSESSMENT — CONF VISUAL FIELD
OS_NORMAL: 1
METHOD: COUNTING FINGERS
OD_NORMAL: 1

## 2020-11-25 NOTE — NURSING NOTE
Chief Complaints and History of Present Illnesses   Patient presents with     Follow Up     Acanthamoeba keratitis - Left Eye      Chief Complaint(s) and History of Present Illness(es)     Follow Up     Laterality: left eye    Course: stable    Associated symptoms: eye pain, tearing and itching.  Negative for dryness    Treatments tried: eye drops and artificial tears    Pain scale: 2/10    Comments: Acanthamoeba keratitis - Left Eye               Comments     She states that her vision has seemed stable in both eyes since her last eye exam.   She is using the PHMB every 2 hours while awake in her left eye.    Frances Stoddard, COT 9:05 AM  November 25, 2020

## 2020-11-25 NOTE — PROGRESS NOTES
CC: Acanthamoeba keratitis    Referring provider: Dr. 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 Patient reports that she noticed redness of the eye starting 10 days ago (Tues 8/18). It was painful but without significant discharge. She saw an optometrist on Monday 8/24 who noted epithelial staining that looked like a dendrite. She was started on Valtrex 500 TID and moxifloxacin. She was not improving, so she was referred to the cornea specialist Dr. Sarai Bassett who noted perineural infiltrates concerning for acanthamoeba and sent patient to [facility] same day for evaluation and confocal microscopy.    Patient is CL wearer. She has daily disposable CL's but will wear the lenses for multiple days at a time after they are placed in saline.     Interval Hx:   VA stable and pain about the same.  Has photophobia when driving only and this is helped with sunglasses. Otherwise no worsening of injection, discharge. No new flashes/floaters.    POHx:  CTL wearer: Yes    Family hx of eye disease: No  Social Hx: (-) smoking, (social) EtOH, (-) illicit drugs    Meds:      PHMB - started 8/28, increased to 0.4 mg/ml on 9/11 --- using q2 hour while awake     Prednisolone acetate QDay left eye - stopped 10/26/20.   Surgical Hx:  None    A/P:  # Acanthamoeba keratitis, left eye  - symptoms began ~8/18, initially treated with Valtrex & moxifloxacin but had NOT received any topical steroids  - radial perineuritis evident on exam  - confocal 8/28 showing signet ring cells located in epithelium and superficial stroma, but did not appear in deeper stroma  - slit lamp photos taken upon presentation, but did not get saved unfortunately  - central 8 mm of epithelium debrided at slit lamp, and samples obtained for Acanthamoeba culture, KOH, gram stain, and routine anaerobic and aerobic culture (8/28/20) - culture positive for Acanthamoeba, otherwise only grew  P acnes in broth only (unlikely pathogenic)  - started q1 hour PHMB on 8/28/20 (initially q1 hour while awake only as instructed, then around the clock starting 9/9  - 9/11: suspect initial scraping relieved active acanthamoeba trophozoites and subsequent worsening vision and pain due to encysted parasites waking up with robust inflammatory response as Acanthamoeba are dying VERSUS worsening of infection/ possible inadequate penetration of PHMB. Therefore increased PHMB concentration and started steroid  - Discussed if improving pain it is likely due to steroid and not necessarily improving infection, therefore need ongoing close follow-up.  - 9/14: Epi defect HEALED. Vision improved. Infiltrate improved. Redness improved.  - 9/23: Epi defect remains healed. Vision stable. Infiltrate again improved.   9/28: Exam essentially stable with central cellular infiltrate.  10/9:  Improved symptoms on PF BID   10/26: Exam stable, forming a scar, vision continues to improve. STOPPED Pred.  10/30: Noted worsening redness, pain, and vision. Self increased PHMB back to q1h while awake.   - 11/2: New cell in AC. Infiltrate stable. No epi defect. 3+ PEE.  - 11/11: AC cell resolved. Scar stable.   - 11/25: Quiet, scar stable.      - Meds:    - PHMB 0.4 mg/ml- decrease to q3h for 1 week, then q4h for 1 week   - Continue ointment QHS     - Has been approved for miltefosine --- hold for now    - Artificial tears every 2-3 hours   - Photos today to monitor progression.   - discussed warning sign's and symptoms to call ASAP   - discussed importance of adhering to good CL hygiene to avoid infection in future   - Disc possible need for addition of chlorhexadine or voriconazole gtts/oral.    - limit ice pack use  - cold temp drives amoeba deeper into cornea.   - discussed possible PTK OS in the future for cornea scarring (discussed possible hyperopic shift)    # Allergic conjunctivitis, both eyes   - pataday prn   - ok to use Flonase      #MGD each eye   - hold on warm compresses for now in setting of acanthamoeba keratitis.     Follow up: 2 weeks      Abilio Li, PGY3  Ophthalmology Resident    Attending Physician Attestation:  Complete documentation of historical and exam elements from today's encounter can be found in the full encounter summary report (not reduplicated in this progress note).  I personally obtained the chief complaint(s) and history of present illness.  I confirmed and edited as necessary the review of systems, past medical/surgical history, family history, social history, and examination findings as documented by others; and I examined the patient myself.  I personally reviewed the relevant tests, images, and reports as documented above.  I formulated and edited as necessary the assessment and plan and discussed the findings and management plan with the patient and family. I personally reviewed the ophthalmic test(s) associated with this encounter, agree with the interpretation(s) as documented by the resident/fellow, and have edited the corresponding report(s) as necessary. - Jose G Chand MD

## 2020-12-09 ENCOUNTER — OFFICE VISIT (OUTPATIENT)
Dept: OPHTHALMOLOGY | Facility: CLINIC | Age: 35
End: 2020-12-09
Attending: OPHTHALMOLOGY
Payer: COMMERCIAL

## 2020-12-09 DIAGNOSIS — B60.13 ACANTHAMOEBA KERATITIS: Primary | ICD-10-CM

## 2020-12-09 DIAGNOSIS — B60.13 ACANTHAMOEBA KERATITIS: ICD-10-CM

## 2020-12-09 PROCEDURE — G0463 HOSPITAL OUTPT CLINIC VISIT: HCPCS

## 2020-12-09 PROCEDURE — 99213 OFFICE O/P EST LOW 20 MIN: CPT | Mod: GC | Performed by: OPHTHALMOLOGY

## 2020-12-09 PROCEDURE — 92285 EXTERNAL OCULAR PHOTOGRAPHY: CPT | Performed by: OPHTHALMOLOGY

## 2020-12-09 ASSESSMENT — REFRACTION_WEARINGRX
OS_CYLINDER: +0.25
OS_AXIS: 081
OD_CYLINDER: SPHERE
OD_SPHERE: -3.25
SPECS_TYPE: SVL
OS_SPHERE: -4.00

## 2020-12-09 ASSESSMENT — CONF VISUAL FIELD
OD_NORMAL: 1
METHOD: COUNTING FINGERS
OS_NORMAL: 1

## 2020-12-09 ASSESSMENT — VISUAL ACUITY
OS_CC: 20/60+2
OD_CC: 20/20
OS_PH_CC: 20/60+2
METHOD: SNELLEN - LINEAR
CORRECTION_TYPE: GLASSES

## 2020-12-09 ASSESSMENT — TONOMETRY
OD_IOP_MMHG: 16
IOP_METHOD: ICARE
OS_IOP_MMHG: 09

## 2020-12-09 ASSESSMENT — EXTERNAL EXAM - RIGHT EYE: OD_EXAM: NORMAL

## 2020-12-09 ASSESSMENT — SLIT LAMP EXAM - LIDS
COMMENTS: NORMAL
COMMENTS: NORMAL

## 2020-12-09 ASSESSMENT — EXTERNAL EXAM - LEFT EYE: OS_EXAM: NORMAL

## 2020-12-09 NOTE — PROGRESS NOTES
CC: Acanthamoeba keratitis    Referring provider: Dr. 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 Patient reports that she noticed redness of the eye starting 10 days ago (Tues 8/18). It was painful but without significant discharge. She saw an optometrist on Monday 8/24 who noted epithelial staining that looked like a dendrite. She was started on Valtrex 500 TID and moxifloxacin. She was not improving, so she was referred to the cornea specialist Dr. Sarai Bassett who noted perineural infiltrates concerning for acanthamoeba and sent patient to [facility] same day for evaluation and confocal microscopy.    Patient is CL wearer. She has daily disposable CL's but will wear the lenses for multiple days at a time after they are placed in saline.     Interval Hx:   Stable over the interval, no changes in vision.  Some itching and intermittent fleeting discomfort, no significant pain.  Not using anlagesic now.   No new flashes/floaters.    POHx:  CTL wearer: Yes    Family hx of eye disease: No  Social Hx: (-) smoking, (social) EtOH, (-) illicit drugs    Meds:      PHMB - started 8/28, increased to 0.4 mg/ml on 9/11 --- using q4h    Prednisolone acetate QDay left eye - stopped 10/26/20.     Surgical Hx:  None    A/P:  # Acanthamoeba keratitis, left eye  - symptoms began ~8/18, initially treated with Valtrex & moxifloxacin but had NOT received any topical steroids  - radial perineuritis evident on exam  - confocal 8/28 showing signet ring cells located in epithelium and superficial stroma, but did not appear in deeper stroma  - slit lamp photos taken upon presentation, but did not get saved unfortunately  - central 8 mm of epithelium debrided at slit lamp, and samples obtained for Acanthamoeba culture, KOH, gram stain, and routine anaerobic and aerobic culture (8/28/20) - culture positive for Acanthamoeba, otherwise only grew P acnes in broth  only (unlikely pathogenic)  - started q1 hour PHMB on 8/28/20 (initially q1 hour while awake only as instructed, then around the clock starting 9/9  - 9/11: suspect initial scraping relieved active acanthamoeba trophozoites and subsequent worsening vision and pain due to encysted parasites waking up with robust inflammatory response as Acanthamoeba are dying VERSUS worsening of infection/ possible inadequate penetration of PHMB. Therefore increased PHMB concentration and started steroid  - Discussed if improving pain it is likely due to steroid and not necessarily improving infection, therefore need ongoing close follow-up.  - 9/14: Epi defect HEALED. Vision improved. Infiltrate improved. Redness improved.  - 9/23: Epi defect remains healed. Vision stable. Infiltrate again improved.   9/28: Exam essentially stable with central cellular infiltrate.  10/9:  Improved symptoms on PF BID   10/26: Exam stable, forming a scar, vision continues to improve. STOPPED Pred.  10/30: Noted worsening redness, pain, and vision. Self increased PHMB back to q1h while awake.   - 11/2: New cell in AC. Infiltrate stable. No epi defect. 3+ PEE.  - 11/11: AC cell resolved. Scar stable.   - 11/25: Quiet, scar stable.  - 12/9: stable, exam unchanged.      - Meds:    - PHMB 0.4 mg/ml- currently q4h WA --> taper 3-2-1 q3days    - if no flare on return, can start pred to fade scar   - Continue ointment QHS   - Has been approved for miltefosine --- hold for now    - Artificial tears every 2-3 hours   - Photos today to monitor progression.   - discussed warning sign's and symptoms to call ASAP   - discussed importance of adhering to good CL hygiene to avoid infection in future   - Disc possible need for addition of chlorhexadine or voriconazole gtts/oral.    - limit ice pack use  - cold temp drives amoeba deeper into cornea.   - discussed possible PTK OS in the future for cornea scarring (discussed possible hyperopic shift)    # Allergic  conjunctivitis, both eyes   - pataday prn   - ok to use Flonase     #MGD each eye   - hold on warm compresses for now in setting of acanthamoeba keratitis.     Follow up: 2 weeks, sooner prn   Jason Goldberg, MD  Cornea & External Disease Fellow  Department of Ophthalmology and Visual Neurosciences    Attending Physician Attestation:  Complete documentation of historical and exam elements from today's encounter can be found in the full encounter summary report (not reduplicated in this progress note).  I personally obtained the chief complaint(s) and history of present illness.  I confirmed and edited as necessary the review of systems, past medical/surgical history, family history, social history, and examination findings as documented by others; and I examined the patient myself.  I personally reviewed the relevant tests, images, and reports as documented above.  I formulated and edited as necessary the assessment and plan and discussed the findings and management plan with the patient and family. I personally reviewed the ophthalmic test(s) associated with this encounter, agree with the interpretation(s) as documented by the resident/fellow, and have edited the corresponding report(s) as necessary. - Jose G Chand MD

## 2020-12-23 ENCOUNTER — OFFICE VISIT (OUTPATIENT)
Dept: OPHTHALMOLOGY | Facility: CLINIC | Age: 35
End: 2020-12-23
Attending: OPHTHALMOLOGY
Payer: COMMERCIAL

## 2020-12-23 DIAGNOSIS — B60.13 ACANTHAMOEBA KERATITIS: ICD-10-CM

## 2020-12-23 PROCEDURE — G0463 HOSPITAL OUTPT CLINIC VISIT: HCPCS

## 2020-12-23 PROCEDURE — 92285 EXTERNAL OCULAR PHOTOGRAPHY: CPT | Performed by: OPHTHALMOLOGY

## 2020-12-23 PROCEDURE — 99215 OFFICE O/P EST HI 40 MIN: CPT | Mod: GC | Performed by: OPHTHALMOLOGY

## 2020-12-23 RX ORDER — PREDNISOLONE ACETATE 10 MG/ML
1 SUSPENSION/ DROPS OPHTHALMIC DAILY
Qty: 15 ML | Refills: 1 | Status: SHIPPED | OUTPATIENT
Start: 2020-12-23 | End: 2021-04-07

## 2020-12-23 ASSESSMENT — REFRACTION_WEARINGRX
OS_SPHERE: -4.00
OD_CYLINDER: SPHERE
SPECS_TYPE: SVL
OS_AXIS: 081
OS_CYLINDER: +0.25
OD_SPHERE: -3.25

## 2020-12-23 ASSESSMENT — VISUAL ACUITY
METHOD: SNELLEN - LINEAR
OD_CC: 20/20
CORRECTION_TYPE: GLASSES
OS_CC: 20/50

## 2020-12-23 ASSESSMENT — TONOMETRY
OD_IOP_MMHG: 14
IOP_METHOD: ICARE
OS_IOP_MMHG: 10

## 2020-12-23 ASSESSMENT — CONF VISUAL FIELD
OD_NORMAL: 1
OS_NORMAL: 1

## 2020-12-23 ASSESSMENT — SLIT LAMP EXAM - LIDS
COMMENTS: NORMAL
COMMENTS: NORMAL

## 2020-12-23 ASSESSMENT — EXTERNAL EXAM - LEFT EYE: OS_EXAM: NORMAL

## 2020-12-23 ASSESSMENT — EXTERNAL EXAM - RIGHT EYE: OD_EXAM: NORMAL

## 2020-12-23 NOTE — PROGRESS NOTES
CC: Acanthamoeba keratitis    Referring provider: Dr. 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 Patient reports that she noticed redness of the eye starting 10 days ago (Tues 8/18). It was painful but without significant discharge. She saw an optometrist on Monday 8/24 who noted epithelial staining that looked like a dendrite. She was started on Valtrex 500 TID and moxifloxacin. She was not improving, so she was referred to the cornea specialist Dr. Sarai Bassett who noted perineural infiltrates concerning for acanthamoeba and sent patient to [facility] same day for evaluation and confocal microscopy.    Patient is CL wearer. She has daily disposable CL's but will wear the lenses for multiple days at a time after they are placed in saline.     Interval Hx:   Stable over the interval, no changes in vision. Eye is comfortable. She stopped PHMB on Saturday (~4-5 days ago) and the eye feels the same since stopping it. Vision remains pretty good. No new flashes, floaters, or diplopia. No pain, redness, or tearing.       POHx:  CTL wearer: Yes    Family hx of eye disease: No  Social Hx: (-) smoking, (social) EtOH, (-) illicit drugs    Meds:      PHMB - started 8/28, increased to 0.4 mg/ml on 9/11 --- Finish taper off on 12/19/20    Prednisolone acetate QDay left eye - stopped 10/26/20.     Surgical Hx:  None    A/P:  # Acanthamoeba keratitis, left eye  - symptoms began ~8/18, initially treated with Valtrex & moxifloxacin but had NOT received any topical steroids  - radial perineuritis evident on exam  - confocal 8/28 showing signet ring cells located in epithelium and superficial stroma, but did not appear in deeper stroma  - slit lamp photos taken upon presentation, but did not get saved unfortunately  - central 8 mm of epithelium debrided at slit lamp, and samples obtained for Acanthamoeba culture, KOH, gram stain, and routine anaerobic and  aerobic culture (8/28/20) - culture positive for Acanthamoeba, otherwise only grew P acnes in broth only (unlikely pathogenic)  - started q1 hour PHMB on 8/28/20 (initially q1 hour while awake only as instructed, then around the clock starting 9/9  - 9/11: suspect initial scraping relieved active acanthamoeba trophozoites and subsequent worsening vision and pain due to encysted parasites waking up with robust inflammatory response as Acanthamoeba are dying VERSUS worsening of infection/ possible inadequate penetration of PHMB. Therefore increased PHMB concentration and started steroid  - Discussed if improving pain it is likely due to steroid and not necessarily improving infection, therefore need ongoing close follow-up.  - 9/14: Epi defect HEALED. Vision improved. Infiltrate improved. Redness improved.  - 9/23: Epi defect remains healed. Vision stable. Infiltrate again improved.   9/28: Exam essentially stable with central cellular infiltrate.  10/9:  Improved symptoms on PF BID   10/26: Exam stable, forming a scar, vision continues to improve. STOPPED Pred.  10/30: Noted worsening redness, pain, and vision. Self increased PHMB back to q1h while awake.   - 11/2: New cell in AC. Infiltrate stable. No epi defect. 3+ PEE.  - 11/11: AC cell resolved. Scar stable.   - 11/25: Quiet, scar stable.  - 12/9: stable, exam unchanged.      - Meds:    - PHMB 0.4 mg/ml- OK to remain OFF   - Start Pred Forte BID x 2 weeks, then STOP. Recheck 4 weeks.   - Continue ointment QHS   - Has been approved for miltefosine --- hold for now    - Artificial tears every 2-3 hours   - Photos today to monitor progression.   - discussed warning sign's and symptoms to call ASAP   - discussed importance of adhering to good CL hygiene to avoid infection in future   - Disc possible need for addition of chlorhexadine or voriconazole gtts/oral.    - limit ice pack use  - cold temp drives amoeba deeper into cornea.   - discussed possible PTK OS in the  future for cornea scarring (discussed possible hyperopic shift)    # Allergic conjunctivitis, both eyes   - pataday prn   - ok to use Flonase     #MGD each eye   - hold on warm compresses for now in setting of acanthamoeba keratitis.     Follow up: 4 weeks, sooner if ANY symptoms worsen. **Pachy + Ant Seg OCT left eye to determine depth of corneal scar**    Alicia Guido MD  Cornea & External Disease Fellow    Attending Physician Attestation:  Complete documentation of historical and exam elements from today's encounter can be found in the full encounter summary report (not reduplicated in this progress note).  I personally obtained the chief complaint(s) and history of present illness.  I confirmed and edited as necessary the review of systems, past medical/surgical history, family history, social history, and examination findings as documented by others; and I examined the patient myself.  I personally reviewed the relevant tests, images, and reports as documented above.  I formulated and edited as necessary the assessment and plan and discussed the findings and management plan with the patient and family. I personally reviewed the ophthalmic test(s) associated with this encounter, agree with the interpretation(s) as documented by the resident/fellow, and have edited the corresponding report(s) as necessary. - Jose G Chand MD    I personally spent great than 40min with the patient, of which >50% of the time was spent face to face with the patient, counseling and coordinating care with the patient. We discussed the complexity of her diagnosis, the need for further information prior to proceeding with yet another surgery, and the unknown prognosis for the patient at this time. Discussed the risks and benefits of PTK.    Jose G Chand MD

## 2020-12-23 NOTE — NURSING NOTE
Chief Complaints and History of Present Illnesses   Patient presents with     Keratitis Follow Up     Chief Complaint(s) and History of Present Illness(es)     Keratitis Follow Up     Laterality: left eye              Comments     Pt. States that she is doing well. No change in VA BE. No pain BE.  Cass Rodriguez COT 9:12 AM December 23, 2020

## 2021-01-22 ENCOUNTER — OFFICE VISIT (OUTPATIENT)
Dept: OPHTHALMOLOGY | Facility: CLINIC | Age: 36
End: 2021-01-22
Attending: OPHTHALMOLOGY
Payer: COMMERCIAL

## 2021-01-22 DIAGNOSIS — H18.712 CORNEAL ECTASIA OF LEFT EYE: ICD-10-CM

## 2021-01-22 DIAGNOSIS — B60.13 ACANTHAMOEBA KERATITIS: Primary | ICD-10-CM

## 2021-01-22 DIAGNOSIS — H16.002 CORNEA ULCER, LEFT: ICD-10-CM

## 2021-01-22 DIAGNOSIS — B60.13 ACANTHAMOEBA KERATITIS: ICD-10-CM

## 2021-01-22 PROCEDURE — 92132 CPTRZD OPH DX IMG ANT SGM: CPT | Performed by: OPHTHALMOLOGY

## 2021-01-22 PROCEDURE — G0463 HOSPITAL OUTPT CLINIC VISIT: HCPCS

## 2021-01-22 PROCEDURE — 99215 OFFICE O/P EST HI 40 MIN: CPT | Performed by: OPHTHALMOLOGY

## 2021-01-22 PROCEDURE — 92285 EXTERNAL OCULAR PHOTOGRAPHY: CPT | Performed by: OPHTHALMOLOGY

## 2021-01-22 PROCEDURE — 76514 ECHO EXAM OF EYE THICKNESS: CPT | Performed by: OPHTHALMOLOGY

## 2021-01-22 ASSESSMENT — VISUAL ACUITY
OS_PH_CC: 20/60
METHOD: SNELLEN - LINEAR
CORRECTION_TYPE: GLASSES
OD_CC+: -1
OS_CC: 20/70
OD_CC: 20/20

## 2021-01-22 ASSESSMENT — PACHYMETRY
OD_CT(UM): 571
OS_CT(UM): 526

## 2021-01-22 ASSESSMENT — REFRACTION_WEARINGRX
OS_AXIS: 067
OD_CYLINDER: SPHERE
OS_CYLINDER: +0.25
OS_SPHERE: -4.00
SPECS_TYPE: SVL
OD_SPHERE: -3.25

## 2021-01-22 ASSESSMENT — REFRACTION_MANIFEST
OD_SPHERE: -3.00
OD_CYLINDER: SPHERE
OS_AXIS: 080
OS_SPHERE: PLANO
OS_CYLINDER: +2.00

## 2021-01-22 ASSESSMENT — EXTERNAL EXAM - LEFT EYE: OS_EXAM: NORMAL

## 2021-01-22 ASSESSMENT — CONF VISUAL FIELD
OS_NORMAL: 1
METHOD: COUNTING FINGERS
OD_NORMAL: 1

## 2021-01-22 ASSESSMENT — EXTERNAL EXAM - RIGHT EYE: OD_EXAM: NORMAL

## 2021-01-22 ASSESSMENT — TONOMETRY
OS_IOP_MMHG: 11
OD_IOP_MMHG: 14
IOP_METHOD: ICARE

## 2021-01-22 ASSESSMENT — SLIT LAMP EXAM - LIDS
COMMENTS: NORMAL
COMMENTS: NORMAL

## 2021-01-22 NOTE — NURSING NOTE
Chief Complaints and History of Present Illnesses   Patient presents with     Follow Up     1 month follow up  Acanthamoeba keratitis, left eye     Chief Complaint(s) and History of Present Illness(es)     Follow Up     Comments: 1 month follow up  Acanthamoeba keratitis, left eye              Comments     Pt states vision is the same as last visit. No flashes or floaters.  No redness. No new dryness.    CHRIS Wren January 22, 2021 9:02 AM

## 2021-01-22 NOTE — PROGRESS NOTES
CC: Acanthamoeba keratitis    Referring provider: Dr. Sarai Bassett    HPI:  Chandrakant Cruz is a(n) 35 year old female who was referred by Dr. Sarai Bassett for acanthamoeba keratitis. Now resolved with corneal scarring OS.   Patient is CL wearer. She has daily disposable CL's but will wear the lenses for multiple days at a time after they are placed in saline.     Interval Hx:   Stable over the interval, no changes in vision. Some fluctuations in the vision. Eye is comfortable. Has been off PHMB x 1 month, was on a prednisolone taper until 2 days ago. No new flashes, floaters, or diplopia. No pain, redness, or tearing.       POHx:  CTL wearer: Yes    Family hx of eye disease: No  Social Hx: (-) smoking, (social) EtOH, (-) illicit drugs    Meds:  Prednisolone acetate QDay left eye - stopped 1/20/20.     Surgical Hx:  None    A/P:  # Acanthamoeba keratitis, left eye  - symptoms began ~8/18, initially treated with Valtrex & moxifloxacin but had NOT received any topical steroids  - radial perineuritis evident on exam  - confocal 8/28 showing signet ring cells located in epithelium and superficial stroma, but did not appear in deeper stroma  - slit lamp photos taken upon presentation, but did not get saved unfortunately  - central 8 mm of epithelium debrided at slit lamp, and samples obtained for Acanthamoeba culture, KOH, gram stain, and routine anaerobic and aerobic culture (8/28/20) - culture positive for Acanthamoeba, otherwise only grew P acnes in broth only (unlikely pathogenic)  - started q1 hour PHMB on 8/28/20 (initially q1 hour while awake only as instructed, then around the clock starting 9/9  - 9/11: suspect initial scraping relieved active acanthamoeba trophozoites and subsequent worsening vision and pain due to encysted parasites waking up with robust inflammatory response as Acanthamoeba are dying VERSUS worsening of infection/ possible inadequate penetration of PHMB. Therefore increased PHMB concentration and  started steroid  - Discussed if improving pain it is likely due to steroid and not necessarily improving infection, therefore need ongoing close follow-up.  - 9/14: Epi defect HEALED. Vision improved. Infiltrate improved. Redness improved.  - 9/23: Epi defect remains healed. Vision stable. Infiltrate again improved.   9/28: Exam essentially stable with central cellular infiltrate.  10/9:  Improved symptoms on PF BID   10/26: Exam stable, forming a scar, vision continues to improve. STOPPED Pred.  10/30: Noted worsening redness, pain, and vision. Self increased PHMB back to q1h while awake.   - 11/2: New cell in AC. Infiltrate stable. No epi defect. 3+ PEE.  - 11/11: AC cell resolved. Scar stable.   - 11/25: Quiet, scar stable.  - 12/9: stable, exam unchanged.      Ant seg OCT today 1/22/21-- PLAN FOR TRANS-EPI ABLATION  microns  (Epi about 64 microns at center, ablate ~36 microns of stroma)  (Central pachy by OCT after 120 micron ablation is 382 microns)  (Total pachy at center is about 500 microns)    - Meds:    - Continue ointment QHS   - Has been approved for miltefosine --- hold for now    - Artificial tears every 2-3 hours   - Photos today to monitor progression.   - anterior segment OCT today + pentacam + MRx   - discussed possible PTK OS in the future for cornea scarring (discussed possible hyperopic shift) once quiet off PHMB x 3 months (currently off ~1month)  - MRX plano +2.00 x80 --> 20/60 (but clearer vision per patient compared to habitual MRx of -4.00)   So patient likely would need subsequent hyperopic PRK left eye  - Pentacam very flat - likely a bit decentered - repeat next visit    # Allergic conjunctivitis, both eyes   - pataday prn   - ok to use Flonase     #MGD each eye   - hold on warm compresses for now in setting of acanthamoeba keratitis.     Follow up: 4 weeks, sooner if ANY symptoms worsen. Repeat Pentacam     Attending Physician Attestation:  Complete documentation of historical and  exam elements from today's encounter can be found in the full encounter summary report (not reduplicated in this progress note).  I personally obtained the chief complaint(s) and history of present illness.  I confirmed and edited as necessary the review of systems, past medical/surgical history, family history, social history, and examination findings as documented by others; and I examined the patient myself.  I personally reviewed the relevant tests, images, and reports as documented above.  I formulated and edited as necessary the assessment and plan and discussed the findings and management plan with the patient and family. - Jose G Chand MD    --------------------------------------------------------------------------------------------------------------------------------------------  Pachymetry - Interpretation & Report  Indication: r/o ectasia OS  Performed by: Jose G Chand MD  Reliability: good  Patient cooperation: good  Findings:   Right eye:  571 micrometers centrally    Left eye:  526 micrometers centrally   Interval Change, Assessment, & Impact on treatment:   Right eye:  baseline   Left eye:  Thinning 2/2 K ulcer OS   Signed: Jose G Chand MD 1/22/2021 6:45 PM      I personally spent great than 40min with the patient, of which >50% of the time was spent face to face with the patient, counseling and coordinating care with the patient. We discussed the complexity of her diagnosis, the need for further information prior to proceeding with yet another surgery, and the unknown prognosis for the patient at this time. Discussed the risks and benefits of PTK.    Jose G Chand MD

## 2021-02-22 ENCOUNTER — OFFICE VISIT (OUTPATIENT)
Dept: OPHTHALMOLOGY | Facility: CLINIC | Age: 36
End: 2021-02-22
Attending: OPHTHALMOLOGY
Payer: COMMERCIAL

## 2021-02-22 DIAGNOSIS — H18.712 CORNEAL ECTASIA OF LEFT EYE: ICD-10-CM

## 2021-02-22 DIAGNOSIS — H16.002 CORNEA ULCER, LEFT: ICD-10-CM

## 2021-02-22 DIAGNOSIS — B60.13 ACANTHAMOEBA KERATITIS: Primary | ICD-10-CM

## 2021-02-22 DIAGNOSIS — B60.13 ACANTHAMOEBA KERATITIS: ICD-10-CM

## 2021-02-22 DIAGNOSIS — H17.9 CORNEAL SCAR AND OPACITY: ICD-10-CM

## 2021-02-22 PROCEDURE — 99215 OFFICE O/P EST HI 40 MIN: CPT | Mod: GC | Performed by: OPHTHALMOLOGY

## 2021-02-22 PROCEDURE — 92025 CPTRIZED CORNEAL TOPOGRAPHY: CPT | Performed by: OPHTHALMOLOGY

## 2021-02-22 PROCEDURE — G0463 HOSPITAL OUTPT CLINIC VISIT: HCPCS | Mod: 25

## 2021-02-22 ASSESSMENT — CONF VISUAL FIELD
OS_NORMAL: 1
OD_NORMAL: 1
METHOD: COUNTING FINGERS

## 2021-02-22 ASSESSMENT — REFRACTION_WEARINGRX
OD_SPHERE: -3.25
OD_CYLINDER: SPHERE
OS_CYLINDER: +0.25
SPECS_TYPE: SVL
OS_AXIS: 067
OS_SPHERE: -4.00

## 2021-02-22 ASSESSMENT — SLIT LAMP EXAM - LIDS
COMMENTS: NORMAL
COMMENTS: NORMAL

## 2021-02-22 ASSESSMENT — EXTERNAL EXAM - LEFT EYE: OS_EXAM: NORMAL

## 2021-02-22 ASSESSMENT — TONOMETRY
OD_IOP_MMHG: 14
OS_IOP_MMHG: 10
IOP_METHOD: ICARE

## 2021-02-22 ASSESSMENT — VISUAL ACUITY
OS_CC: 20/60
CORRECTION_TYPE: GLASSES
OD_CC: 20/20
METHOD: SNELLEN - LINEAR

## 2021-02-22 ASSESSMENT — EXTERNAL EXAM - RIGHT EYE: OD_EXAM: NORMAL

## 2021-02-22 NOTE — NURSING NOTE
Chief Complaints and History of Present Illnesses   Patient presents with     Follow Up     1 month follow up  Acanthamoeba keratitis, left eye     Chief Complaint(s) and History of Present Illness(es)     Follow Up     Laterality: left eye    Quality: blurred vision    Course: stable    Associated symptoms: eye pain.  Negative for dryness, tearing and discharge    Pain scale: 2/10    Comments: 1 month follow up  Acanthamoeba keratitis, left eye              Comments     Pt denies any significant vision changes LE since last visit.  Complains of intermittent achy feeling LE.  Denies any discharge or tearing.  Ocular meds: AT's PRN BE    Adina Slater OT 3:07 PM February 22, 2021

## 2021-02-22 NOTE — PROGRESS NOTES
CC: Acanthamoeba keratitis    Referring provider: Dr. Sarai Bassett    HPI:  Chandrakant Cruz is a(n) 35 year old female who was referred by Dr. Sarai Bassett for acanthamoeba keratitis. Now resolved with corneal scarring OS.   Patient is CL wearer. She has daily disposable CL's but will wear the lenses for multiple days at a time after they are placed in saline.     Interval Hx:   Patient states she has random occasional aching OS just for a few moments - occurs about daily. No changes in vision. No new flashes, floaters, or diplopia. No pain, redness, or tearing. No photophobia OU.    POHx:  H/o CTL wear   Acanthamoeba keratitis OS    Family hx of eye disease: No  Social Hx: (-) smoking, (social) EtOH, (-) illicit drugs    Meds:  None     Surgical Hx:  None    A/P:  # Acanthamoeba keratitis, left eye  - symptoms began ~8/18, initially treated with Valtrex & moxifloxacin but had NOT received any topical steroids  - radial perineuritis evident on exam  - confocal 8/28 showing signet ring cells located in epithelium and superficial stroma, but did not appear in deeper stroma  - slit lamp photos taken upon presentation, but did not get saved unfortunately  - central 8 mm of epithelium debrided at slit lamp, and samples obtained for Acanthamoeba culture, KOH, gram stain, and routine anaerobic and aerobic culture (8/28/20) - culture positive for Acanthamoeba, otherwise only grew P acnes in broth only (unlikely pathogenic)  - started q1 hour PHMB on 8/28/20 (initially q1 hour while awake only as instructed, then around the clock starting 9/9  - 9/11: suspect initial scraping relieved active acanthamoeba trophozoites and subsequent worsening vision and pain due to encysted parasites waking up with robust inflammatory response as Acanthamoeba are dying VERSUS worsening of infection/ possible inadequate penetration of PHMB. Therefore increased PHMB concentration and started steroid  - Discussed if improving pain it is likely due to  steroid and not necessarily improving infection, therefore need ongoing close follow-up.  - 9/14: Epi defect HEALED. Vision improved. Infiltrate improved. Redness improved.  - 9/23: Epi defect remains healed. Vision stable. Infiltrate again improved.   9/28: Exam essentially stable with central cellular infiltrate.  10/9:  Improved symptoms on PF BID   10/26: Exam stable, forming a scar, vision continues to improve. STOPPED Pred.  10/30: Noted worsening redness, pain, and vision. Self increased PHMB back to q1h while awake.   - 11/2: New cell in AC. Infiltrate stable. No epi defect. 3+ PEE.  - 11/11: AC cell resolved. Scar stable.   - 11/25: Quiet, scar stable.  - 12/9: stable, exam unchanged.    - 2/22: stable, exam, unchanged. Pentacam flat with inferocentral thinning and ectasia OS - stable    - Meds:    - Continue ointment QHS   - Has been approved for miltefosine --- hold for now    - Artificial tears every 2-3 hours   - Photos today to monitor progression.   - anterior segment OCT today + pentacam + MRx  - MRX plano +2.00 x80 --> 20/60 (but clearer vision per patient compared to habitual MRx of -4.00)  - PLAN FOR TRANS-EPI ABLATION  microns (now off PHM ~3 months)  (Epi about 64 microns at center, ablate ~36 microns of stroma)  (Central pachy by OCT after 120 micron ablation is 382 microns)  (Total pachy at center is about 500 microns)  - Patient likely would need subsequent hyperopic PRK left eye      # Allergic conjunctivitis, both eyes   - pataday prn   - ok to use Flonase     #MGD each eye   - hold on warm compresses for now in setting of acanthamoeba keratitis.     Follow up: Schedule for PTK OS next available. See POD1, POW1    Marci Shearer MD  Ophthalmology PGY-3  [facility]    Attending Physician Attestation:  Complete documentation of historical and exam elements from today's encounter can be found in the full encounter summary report (not reduplicated in this progress note).  I  personally obtained the chief complaint(s) and history of present illness.  I confirmed and edited as necessary the review of systems, past medical/surgical history, family history, social history, and examination findings as documented by others; and I examined the patient myself.  I personally reviewed the relevant tests, images, and reports as documented above.  I formulated and edited as necessary the assessment and plan and discussed the findings and management plan with the patient and family. I personally reviewed the ophthalmic test(s) associated with this encounter, agree with the interpretation(s) as documented by the resident/fellow, and have edited the corresponding report(s) as necessary. - Jose G Chand MD    I personally spent great than 40min with the patient, of which >50% of the time was spent face to face with the patient, counseling and coordinating care with the patient. We discussed the complexity of her diagnosis, the need for further information prior to proceeding with yet another surgery, and the unknown prognosis for the patient at this time. Spent extensive time discussing refractive surgery bilateral vs contact lens OD, vs contact lens OU.    Jsoe G Chand MD

## 2021-03-17 ENCOUNTER — TELEPHONE (OUTPATIENT)
Dept: OPHTHALMOLOGY | Facility: CLINIC | Age: 36
End: 2021-03-17

## 2021-03-17 ENCOUNTER — AMBULATORY - HEALTHEAST (OUTPATIENT)
Dept: FAMILY MEDICINE | Facility: CLINIC | Age: 36
End: 2021-03-17

## 2021-03-17 ENCOUNTER — OFFICE VISIT - HEALTHEAST (OUTPATIENT)
Dept: FAMILY MEDICINE | Facility: CLINIC | Age: 36
End: 2021-03-17

## 2021-03-17 DIAGNOSIS — R35.0 URINARY FREQUENCY: ICD-10-CM

## 2021-03-17 DIAGNOSIS — R10.84 ABDOMINAL PAIN, GENERALIZED: ICD-10-CM

## 2021-03-17 LAB
ALBUMIN UR-MCNC: NEGATIVE G/DL
APPEARANCE UR: CLEAR
BILIRUB UR QL STRIP: NEGATIVE
COLOR UR AUTO: YELLOW
ERYTHROCYTE [DISTWIDTH] IN BLOOD BY AUTOMATED COUNT: 13 % (ref 11–14.5)
GLUCOSE UR STRIP-MCNC: NEGATIVE MG/DL
HBA1C MFR BLD: 5.4 %
HCT VFR BLD AUTO: 42.1 % (ref 35–47)
HGB BLD-MCNC: 13.7 G/DL (ref 12–16)
HGB UR QL STRIP: NEGATIVE
KETONES UR STRIP-MCNC: NEGATIVE MG/DL
LEUKOCYTE ESTERASE UR QL STRIP: NEGATIVE
MCH RBC QN AUTO: 28.6 PG (ref 27–34)
MCHC RBC AUTO-ENTMCNC: 32.5 G/DL (ref 32–36)
MCV RBC AUTO: 88 FL (ref 80–100)
NITRATE UR QL: NEGATIVE
PH UR STRIP: 6.5 [PH] (ref 5–8)
PLATELET # BLD AUTO: 242 THOU/UL (ref 140–440)
PMV BLD AUTO: 11.7 FL (ref 7–10)
RBC # BLD AUTO: 4.79 MILL/UL (ref 3.8–5.4)
SP GR UR STRIP: 1.02 (ref 1–1.03)
UROBILINOGEN UR STRIP-ACNC: NORMAL
WBC: 6.1 THOU/UL (ref 4–11)

## 2021-03-17 NOTE — TELEPHONE ENCOUNTER
FUTURE VISIT INFORMATION      SURGERY INFORMATION:    Date: 4.1.21    Location:     Surgeon:  Dr. Chand    Anesthesia Type:      Procedure:     Consult: 2.22.21    *procedure not scheduled yet. Will check back    RECORDS REQUESTED FROM:

## 2021-03-17 NOTE — TELEPHONE ENCOUNTER
Called Chadnrakant to let her know we got approval to go ahead and schedule for TOY on 04/01. I will mail out the surgery packet with all the information we discussed yesterday when setting appointments.

## 2021-03-18 LAB
ALBUMIN SERPL-MCNC: 4 G/DL (ref 3.5–5)
ALP SERPL-CCNC: 61 U/L (ref 45–120)
ALT SERPL W P-5'-P-CCNC: 18 U/L (ref 0–45)
ANION GAP SERPL CALCULATED.3IONS-SCNC: 10 MMOL/L (ref 5–18)
AST SERPL W P-5'-P-CCNC: 17 U/L (ref 0–40)
BILIRUB SERPL-MCNC: 0.2 MG/DL (ref 0–1)
BUN SERPL-MCNC: 9 MG/DL (ref 8–22)
CALCIUM SERPL-MCNC: 9.3 MG/DL (ref 8.5–10.5)
CHLORIDE BLD-SCNC: 102 MMOL/L (ref 98–107)
CO2 SERPL-SCNC: 27 MMOL/L (ref 22–31)
CREAT SERPL-MCNC: 0.73 MG/DL (ref 0.6–1.1)
GFR SERPL CREATININE-BSD FRML MDRD: >60 ML/MIN/1.73M2
GLUCOSE BLD-MCNC: 89 MG/DL (ref 70–125)
POTASSIUM BLD-SCNC: 4.5 MMOL/L (ref 3.5–5)
PROT SERPL-MCNC: 7.3 G/DL (ref 6–8)
SODIUM SERPL-SCNC: 139 MMOL/L (ref 136–145)

## 2021-03-30 ENCOUNTER — PRE VISIT (OUTPATIENT)
Dept: SURGERY | Facility: CLINIC | Age: 36
End: 2021-03-30

## 2021-03-30 ENCOUNTER — ANESTHESIA EVENT (OUTPATIENT)
Dept: SURGERY | Facility: CLINIC | Age: 36
End: 2021-03-30

## 2021-03-30 ENCOUNTER — OFFICE VISIT (OUTPATIENT)
Dept: SURGERY | Facility: CLINIC | Age: 36
End: 2021-03-30
Payer: COMMERCIAL

## 2021-03-30 VITALS
BODY MASS INDEX: 23.16 KG/M2 | RESPIRATION RATE: 20 BRPM | HEART RATE: 72 BPM | OXYGEN SATURATION: 98 % | TEMPERATURE: 97.6 F | DIASTOLIC BLOOD PRESSURE: 63 MMHG | WEIGHT: 118 LBS | SYSTOLIC BLOOD PRESSURE: 97 MMHG | HEIGHT: 60 IN

## 2021-03-30 DIAGNOSIS — B60.13 ACANTHAMOEBA KERATITIS: Primary | ICD-10-CM

## 2021-03-30 DIAGNOSIS — Z01.818 PREOP EXAMINATION: Primary | ICD-10-CM

## 2021-03-30 DIAGNOSIS — B60.13 ACANTHAMOEBA KERATITIS: ICD-10-CM

## 2021-03-30 LAB
SARS-COV-2 RNA RESP QL NAA+PROBE: NORMAL
SPECIMEN SOURCE: NORMAL

## 2021-03-30 PROCEDURE — U0005 INFEC AGEN DETEC AMPLI PROBE: HCPCS | Performed by: PATHOLOGY

## 2021-03-30 PROCEDURE — U0003 INFECTIOUS AGENT DETECTION BY NUCLEIC ACID (DNA OR RNA); SEVERE ACUTE RESPIRATORY SYNDROME CORONAVIRUS 2 (SARS-COV-2) (CORONAVIRUS DISEASE [COVID-19]), AMPLIFIED PROBE TECHNIQUE, MAKING USE OF HIGH THROUGHPUT TECHNOLOGIES AS DESCRIBED BY CMS-2020-01-R: HCPCS | Mod: 90 | Performed by: PATHOLOGY

## 2021-03-30 PROCEDURE — 99203 OFFICE O/P NEW LOW 30 MIN: CPT | Performed by: PHYSICIAN ASSISTANT

## 2021-03-30 RX ORDER — MULTIPLE VITAMINS W/ MINERALS TAB 9MG-400MCG
1 TAB ORAL EVERY MORNING
COMMUNITY

## 2021-03-30 SDOH — HEALTH STABILITY: MENTAL HEALTH: HOW MANY STANDARD DRINKS CONTAINING ALCOHOL DO YOU HAVE ON A TYPICAL DAY?: NOT ASKED

## 2021-03-30 SDOH — HEALTH STABILITY: MENTAL HEALTH: HOW OFTEN DO YOU HAVE 6 OR MORE DRINKS ON ONE OCCASION?: NOT ASKED

## 2021-03-30 SDOH — HEALTH STABILITY: MENTAL HEALTH: HOW OFTEN DO YOU HAVE A DRINK CONTAINING ALCOHOL?: NOT ASKED

## 2021-03-30 ASSESSMENT — PAIN SCALES - GENERAL: PAINLEVEL: NO PAIN (0)

## 2021-03-30 ASSESSMENT — ENCOUNTER SYMPTOMS: SEIZURES: 0

## 2021-03-30 ASSESSMENT — MIFFLIN-ST. JEOR: SCORE: 1146.74

## 2021-03-30 ASSESSMENT — LIFESTYLE VARIABLES: TOBACCO_USE: 0

## 2021-03-30 NOTE — H&P
Pre-Operative H & P     CC:  Preoperative exam to assess for increased cardiopulmonary risk while undergoing surgery and anesthesia.    Date of Encounter: 3/30/2021  Primary Care Physician:  No Ref-Primary, Physician  Associated Diagnosis: corneal scarring, left eye    HPI  Chandrakant Cruz is a 35 year old female who presents for pre-operative H & P in preparation for phototherapeutic keratectomy, left eye with Dr. Chand on 4/1/21 at [facility] [place].     This is a 35-year-old female with an unremarkable past medical history.  Patient was diagnosed with a cancer me by keratitis in August 2020.  This is resolved but she now has corneal scarring.  Patient is a contact lens wearer, disposable but will wear the lenses for multiple days at a time after the reduction saline.  The above procedure is now planned.    History is obtained from the patient and the medical record.    Past Medical History  Past Medical History:   Diagnosis Date     Acanthamoeba keratitis      ADD (attention deficit disorder with hyperactivity)        Past Surgical History  Past Surgical History:   Procedure Laterality Date     none         Hx of Blood transfusions/reactions: denies     Hx of abnormal bleeding or anti-platelet use: denies    Menstrual history: Patient's last menstrual period was 03/29/2021 (approximate).:     Steroid use in the last year: steroid eye drops    Personal or FH with difficulty with Anesthesia:  denies    Prior to Admission Medications  Current Outpatient Medications   Medication Sig Dispense Refill     cetirizine (ZYRTEC) 10 MG tablet Take 10 mg by mouth daily       multivitamin w/minerals (MULTI-VITAMIN) tablet Take 1 tablet by mouth every morning       COMPOUNDED NON-CONTROLLED SUBSTANCE (CMPD RX) - PHARMACY TO MIX COMPOUNDED MEDICATION Apply 1 drop every 1 hour while awake in the LEFT eye until follow up. (Patient taking differently: Apply 1 drop every 1 hour while awake in the LEFT eye until follow up.) 1 Bottle  3     moxifloxacin (VIGAMOX) 0.5 % ophthalmic solution 1 drop 4 times daily        NONFORMULARY Apply 1 drop to eye every hour While awake. Use every 2 hours overnight. (Patient taking differently: Apply 1 drop to eye every hour While awake. Use every 2 hours overnight.) 1 each 3     polyhexamethylene biguanide 0.2mg/mL (BAQUACIL) 0.2mg/ml compounded ophthalmic solution Place 1 drop Into the left eye every 2 hours (while awake)        prednisoLONE acetate (PRED FORTE) 1 % ophthalmic suspension Place 1 drop Into the left eye daily (Patient taking differently: Place 1 drop Into the left eye daily ) 15 mL 1       Allergies  Allergies   Allergen Reactions     Nkda [No Known Drug Allergies]        Social History  Social History     Socioeconomic History     Marital status:      Spouse name: Not on file     Number of children: Not on file     Years of education: Not on file     Highest education level: Not on file   Occupational History     Not on file   Social Needs     Financial resource strain: Not on file     Food insecurity     Worry: Not on file     Inability: Not on file     Transportation needs     Medical: Not on file     Non-medical: Not on file   Tobacco Use     Smoking status: Never Smoker     Smokeless tobacco: Never Used   Substance and Sexual Activity     Alcohol use: Not Currently     Drug use: No     Sexual activity: Yes   Lifestyle     Physical activity     Days per week: Not on file     Minutes per session: Not on file     Stress: Not on file   Relationships     Social connections     Talks on phone: Not on file     Gets together: Not on file     Attends [employer] service: Not on file     Active member of club or organization: Not on file     Attends meetings of clubs or organizations: Not on file     Relationship status: Not on file     Intimate partner violence     Fear of current or ex partner: Not on file     Emotionally abused: Not on file     Physically abused: Not on file     Forced sexual  "activity: Not on file   Other Topics Concern     Not on file   Social History Narrative     Not on file       Family History  Family History   Problem Relation Age of Onset     Glaucoma Maternal Grandmother      No Known Problems Mother      Diabetes Father      Heart Failure Father      Macular Degeneration No family hx of            Anesthesia Evaluation   Pt has had prior anesthetic. Type of anesthetic: sedation for wisdom teeth extraction.    No history of anesthetic complications       ROS/MED HX  ENT/Pulmonary:  - neg pulmonary ROS  (-) tobacco use   Neurologic:  - neg neurologic ROS  (-) no seizures and no CVA   Cardiovascular:  - neg cardiovascular ROS   (+) -----No previous cardiac testing     METS/Exercise Tolerance: >4 METS    Hematologic:  - neg hematologic  ROS  (-) history of blood clots and history of blood transfusion  --pt reports her maternal Uncle has FVL deficiency.   Musculoskeletal:  - neg musculoskeletal ROS     GI/Hepatic:  - neg GI/hepatic ROS     Renal/Genitourinary:  - neg Renal ROS     Endo:  - neg endo ROS     Psychiatric/Substance Use:  - neg psychiatric ROS     Infectious Disease:  - neg infectious disease ROS     Malignancy:  - neg malignancy ROS     Other:  - neg other ROS            The complete review of systems is negative other than noted in the HPI or here.   Temp: 97.6  F (36.4  C) Temp src: Oral BP: 97/63 Pulse: 72   Resp: 20 SpO2: 98 %         118 lbs 0 oz  5' 0\"[pt reported[   Body mass index is 23.05 kg/m .       Physical Exam  Constitutional: Awake, alert, cooperative, no apparent distress, and appears stated age.  Eyes: Pupils equal, round and reactive to light, extra ocular muscles intact, sclera clear, conjunctiva normal.  HENT: Normocephalic, oral pharynx with moist mucus membranes, good dentition. No goiter appreciated.   Respiratory: Clear to auscultation bilaterally, no crackles or wheezing.  Cardiovascular: Regular rate and rhythm, normal S1 and S2, and no murmur " noted.  Carotids +2, no bruits. No edema. Palpable pulses to radial  DP and PT arteries.   GI: Normal bowel sounds  Lymph/Hematologic: No cervical lymphadenopathy and no supraclavicular lymphadenopathy.  Genitourinary:  deferred  Skin: Warm and dry.  No rashes at anticipated surgical site.   Musculoskeletal: Full ROM of neck. There is no redness, warmth, or swelling of the joints. Gross motor strength is normal.    Neurologic: Awake, alert, oriented to name, place and time. Cranial nerves II-XII are grossly intact. Gait is normal.   Neuropsychiatric: Calm, cooperative. Normal affect.     PRIOR LABS/DIAGNOSTIC STUDIES:  All labs and imaging personally reviewed    3/17/21  WBC 4.0 - 11.0 thou/uL 6.1    RBC 3.80 - 5.40 mill/uL 4.79    Hemoglobin 12.0 - 16.0 g/dL 13.7    Hematocrit 35.0 - 47.0 % 42.1    MCV 80 - 100 fL 88    MCH 27.0 - 34.0 pg 28.6    MCHC 32.0 - 36.0 g/dL 32.5    RDW 11.0 - 14.5 % 13.0    Platelets 140 - 440 thou/uL 242    MPV 7.0 - 10.0 fL 11.7High      Hemoglobin A1c <=5.6 % 5.4      Sodium 136 - 145 mmol/L 139    Potassium 3.5 - 5.0 mmol/L 4.5    Chloride 98 - 107 mmol/L 102    CO2 22 - 31 mmol/L 27    Anion Gap, Calculation 5 - 18 mmol/L 10    Glucose 70 - 125 mg/dL 89    BUN 8 - 22 mg/dL 9    Creatinine 0.60 - 1.10 mg/dL 0.73    GFR MDRD Af Amer >60 mL/min/1.73m2 >60    GFR MDRD Non Af Amer >60 mL/min/1.73m2 >60    Bilirubin, Total 0.0 - 1.0 mg/dL 0.2    Calcium 8.5 - 10.5 mg/dL 9.3    Protein, Total 6.0 - 8.0 g/dL 7.3    Albumin 3.5 - 5.0 g/dL 4.0    Alkaline Phosphatase 45 - 120 U/L 61    AST 0 - 40 U/L 17    ALT 0 - 45 U/L 18          Outside records reviewed from: care everywhere      ASSESSMENT and PLAN  Chandrakant Cruz is a 35 year old female scheduled for Phototherapeutic keratectomy, left eye on 4/1/21 by Dr. Chand in treatment of acanthamoeba keratitis, resolved with corneal scarring.  PAC referral for risk assessment and optimization for anesthesia:    Pre-operative considerations:  1.  Cardiac:   Functional status- METS >4. Pt is a runner.  Low risk surgery with 0.4% (RCRI #) risk of major adverse cardiac event. No cardiac history, no chest pain, SOB, BARAJAS, palpitations, orthopnea, edema.  2.  Pulm:  Airway feasible.  ARCENIO risk: Low.  Never smoker  3.  GI:  Risk of PONV score = 2.  If > 2, anti-emetic intervention recommended.    VTE risk: 0.26%    Patient is optimized and is acceptable candidate for the proposed procedure.  No further diagnostic evaluation is needed.       Yolis Govea PA-C  Preoperative Assessment Center  [facility]  Phone: [phone]  Fax: [phone]

## 2021-03-30 NOTE — ANESTHESIA PREPROCEDURE EVALUATION
Anesthesia Pre-Procedure Evaluation    Patient: Chandrakant Cruz   MRN: [id number] : 1985        Preoperative Diagnosis: * No surgery found *   Procedure :      Past Medical History:   Diagnosis Date     Acanthamoeba keratitis      ADD (attention deficit disorder with hyperactivity)       Past Surgical History:   Procedure Laterality Date     none        Allergies   Allergen Reactions     Nkda [No Known Drug Allergies]       Social History     Tobacco Use     Smoking status: Never Smoker     Smokeless tobacco: Never Used   Substance Use Topics     Alcohol use: Yes     Comment: few drinks on weekends      Wt Readings from Last 1 Encounters:   09/15/11 47.6 kg (105 lb)        Anesthesia Evaluation   Pt has had prior anesthetic. Type of anesthetic: sedation for wisdom teeth extraction.    No history of anesthetic complications       ROS/MED HX  ENT/Pulmonary:  - neg pulmonary ROS  (-) tobacco use   Neurologic:  - neg neurologic ROS  (-) no seizures and no CVA   Cardiovascular:  - neg cardiovascular ROS   (+) -----No previous cardiac testing     METS/Exercise Tolerance: >4 METS    Hematologic: Comments: Pt reports her Maternal uncle has factor V Leiden deficiency.    Pt denies h/o clots - neg hematologic  ROS  (-) history of blood clots and history of blood transfusion   Musculoskeletal:  - neg musculoskeletal ROS     GI/Hepatic:  - neg GI/hepatic ROS     Renal/Genitourinary:  - neg Renal ROS     Endo:  - neg endo ROS     Psychiatric/Substance Use:  - neg psychiatric ROS     Infectious Disease:  - neg infectious disease ROS     Malignancy:  - neg malignancy ROS     Other:  - neg other ROS          Physical Exam    Airway  airway exam normal      Mallampati: I   TM distance: > 3 FB   Neck ROM: full   Mouth opening: > 3 cm    Respiratory Devices and Support         Dental  no notable dental history         Cardiovascular   cardiovascular exam normal       Rhythm and rate: regular and normal     Pulmonary   pulmonary  exam normal        breath sounds clear to auscultation           OUTSIDE LABS:  CBC: No results found for: WBC, HGB, HCT, PLT  BMP:   Lab Results   Component Value Date     09/15/2011    POTASSIUM 4.2 09/15/2011    CHLORIDE 102 09/15/2011    CO2 29 09/15/2011    BUN 10 09/15/2011    CR 0.63 09/15/2011    GLC 94 09/15/2011     COAGS: No results found for: PTT, INR, FIBR  POC: No results found for: BGM, HCG, HCGS  HEPATIC: No results found for: ALBUMIN, PROTTOTAL, ALT, AST, GGT, ALKPHOS, BILITOTAL, BILIDIRECT, RICKEY  OTHER:   Lab Results   Component Value Date    YOSHI 9.0 09/15/2011             PAC Discussion and Assessment    ASA Classification: 2                      PAC Resident/NP Anesthesia Assessment: Chandrakant Cruz is a 35 year old female scheduled for Phototherapeutic keratectomy, left eye on 4/1/21 by Dr. Chand in treatment of acanthamoeba keratitis, resolved with corneal scarring.  PAC referral for risk assessment and optimization for anesthesia:    Pre-operative considerations:  1.  Cardiac:  Functional status- METS >4. Pt is a runner.  Low risk surgery with 0.4% (RCRI #) risk of major adverse cardiac event. No cardiac history, no chest pain, SOB, BARAJAS, palpitations, orthopnea, edema.  2.  Pulm:  Airway feasible.  ARCENIO risk: Low.  Never smoker  3.  GI:  Risk of PONV score = 2.  If > 2, anti-emetic intervention recommended.    VTE risk: 0.26%    Patient is optimized and is acceptable candidate for the proposed procedure.  No further diagnostic evaluation is needed.         **For further details of assessment, testing, and physical exam please see H and P completed on same date.          Yolis Govea PA-C, [facility]    Reviewed and Signed by PAC Mid-Level Provider/Resident  Mid-Level Provider/Resident: Yolis Govea  Date: 3/30/21                                 Yolis Govea PA-C

## 2021-03-31 LAB
LABORATORY COMMENT REPORT: NORMAL
SARS-COV-2 RNA RESP QL NAA+PROBE: NEGATIVE
SPECIMEN SOURCE: NORMAL

## 2021-04-01 ENCOUNTER — TRANSFERRED RECORDS (OUTPATIENT)
Dept: HEALTH INFORMATION MANAGEMENT | Facility: CLINIC | Age: 36
End: 2021-04-01

## 2021-04-02 ENCOUNTER — OFFICE VISIT (OUTPATIENT)
Dept: OPHTHALMOLOGY | Facility: CLINIC | Age: 36
End: 2021-04-02
Attending: STUDENT IN AN ORGANIZED HEALTH CARE EDUCATION/TRAINING PROGRAM
Payer: COMMERCIAL

## 2021-04-02 DIAGNOSIS — Z98.890 HISTORY OF PHOTOTHERAPEUTIC KERATECTOMY: ICD-10-CM

## 2021-04-02 DIAGNOSIS — Z98.890 POSTOPERATIVE EYE STATE: ICD-10-CM

## 2021-04-02 DIAGNOSIS — B60.13 ACANTHAMOEBA KERATITIS: Primary | ICD-10-CM

## 2021-04-02 DIAGNOSIS — H17.9 CORNEAL SCAR AND OPACITY: ICD-10-CM

## 2021-04-02 PROCEDURE — 99024 POSTOP FOLLOW-UP VISIT: CPT | Mod: GC | Performed by: STUDENT IN AN ORGANIZED HEALTH CARE EDUCATION/TRAINING PROGRAM

## 2021-04-02 RX ORDER — OFLOXACIN 3 MG/ML
SOLUTION/ DROPS OPHTHALMIC
COMMUNITY
Start: 2021-04-01 | End: 2021-05-07

## 2021-04-02 ASSESSMENT — VISUAL ACUITY
OS_CC: 20/150
OD_CC: 20/20
OS_PH_CC: 20/60
CORRECTION_TYPE: CONTACTS
METHOD: SNELLEN - LINEAR

## 2021-04-02 ASSESSMENT — SLIT LAMP EXAM - LIDS
COMMENTS: NORMAL
COMMENTS: NORMAL

## 2021-04-02 ASSESSMENT — CONF VISUAL FIELD
METHOD: COUNTING FINGERS
OS_NORMAL: 1
OD_NORMAL: 1

## 2021-04-02 ASSESSMENT — EXTERNAL EXAM - RIGHT EYE: OD_EXAM: NORMAL

## 2021-04-02 ASSESSMENT — EXTERNAL EXAM - LEFT EYE: OS_EXAM: NORMAL

## 2021-04-02 ASSESSMENT — TONOMETRY
OD_IOP_MMHG: 12
OS_IOP_MMHG: 13
IOP_METHOD: ICARE

## 2021-04-02 NOTE — NURSING NOTE
Chief Complaints and History of Present Illnesses   Patient presents with     Post Op (Ophthalmology) Left Eye      Chief Complaint(s) and History of Present Illness(es)     Post Op (Ophthalmology) Left Eye     Laterality: left eye    Associated symptoms: eye pain.  Negative for redness and tearing    Pain scale: 2/10              Comments     1 day post op of PTK left eye with Dr. Chand 4/1/2021 [facility].  Vision left eye went from foggy vision to blurred vision.  Wearing soft contact lens right eye, BCL left eye.  Eye meds: ocufloxacin left eye, PF left eye   YOSEPH Zamora 4/2/2021 12:56 PM

## 2021-04-02 NOTE — PROGRESS NOTES
CC: Acanthamoeba keratitis    Referring provider: Dr. Sarai Bassett    HPI:  Chandrakant Cruz is a(n) 35 year old female who was referred by Dr. Sarai Bassett for acanthamoeba keratitis. Now resolved with corneal scarring OS.   Patient is CL wearer. She has daily disposable CL's but will wear the lenses for multiple days at a time after they are placed in saline.     Interval Hx:   POD 1 s/p PTK left eye.  Doing well.  Pain has been well controlled by tylonol 3.  Vision no longer cloudy, just blurry.     POHx:  H/o CTL wear   Acanthamoeba keratitis OS    Family hx of eye disease: No  Social Hx: (-) smoking, (social) EtOH, (-) illicit drugs    Meds:  PF q2h left eye  ofloxcin 4x/day  PFAT q1-2h     Surgical Hx:  None    A/P:  # Acanthamoeba keratitis, left eye  - symptoms began ~8/18, initially treated with Valtrex & moxifloxacin but had NOT received any topical steroids  - radial perineuritis evident on exam  - confocal 8/28 showing signet ring cells located in epithelium and superficial stroma, but did not appear in deeper stroma  - slit lamp photos taken upon presentation, but did not get saved unfortunately  - central 8 mm of epithelium debrided at slit lamp, and samples obtained for Acanthamoeba culture, KOH, gram stain, and routine anaerobic and aerobic culture (8/28/20) - culture positive for Acanthamoeba, otherwise only grew P acnes in broth only (unlikely pathogenic)  - started q1 hour PHMB on 8/28/20 (initially q1 hour while awake only as instructed, then around the clock starting 9/9  - 9/11: suspect initial scraping relieved active acanthamoeba trophozoites and subsequent worsening vision and pain due to encysted parasites waking up with robust inflammatory response as Acanthamoeba are dying VERSUS worsening of infection/ possible inadequate penetration of PHMB. Therefore increased PHMB concentration and started steroid  - Discussed if improving pain it is likely due to steroid and not necessarily improving  infection, therefore need ongoing close follow-up.  - 9/14: Epi defect HEALED. Vision improved. Infiltrate improved. Redness improved.  - 9/23: Epi defect remains healed. Vision stable. Infiltrate again improved.   9/28: Exam essentially stable with central cellular infiltrate.  10/9:  Improved symptoms on PF BID   10/26: Exam stable, forming a scar, vision continues to improve. STOPPED Pred.  10/30: Noted worsening redness, pain, and vision. Self increased PHMB back to q1h while awake.   - 11/2: New cell in AC. Infiltrate stable. No epi defect. 3+ PEE.  - 11/11: AC cell resolved. Scar stable.   - 11/25: Quiet, scar stable.  - 12/9: stable, exam unchanged.    - 2/22: stable, exam, unchanged. Pentacam flat with inferocentral thinning and ectasia OS - stable    - 4/2/21: POD 1 s/p PTK left eye 4/1/21 -- doing well, subjective haze resolved, now blurry (not epithelialized)  Reduced scar size.  TRANS-EPI ABLATION  microns at 6.5mm and peripheral treatment performed temporally 2mm spots between 4mm and 6.5mm zone.   Plan for additional PTK to reduce residual haze with concurrent PRK same day vs. PRK at later date.    - Meds:    - PF q2h left eye x 1 day, then QID OS   - ofloxacin QID OS   - PFAT q1-2h    - UV protection   - reviewed post-op precautions    # Allergic conjunctivitis, both eyes   - pataday prn   - ok to use Flonase     #MGD each eye   - hold on warm compresses for now in setting of acanthamoeba keratitis.     Follow up: POW 1, sooner prn     Jason Goldberg, MD  Cornea & External Disease Fellow  Department of Ophthalmology and Visual Neurosciences    Attending Physician Attestation:  Complete documentation of historical and exam elements from today's encounter can be found in the full encounter summary report (not reduplicated in this progress note).  I personally obtained the chief complaint(s) and history of present illness.  I confirmed and edited as necessary the review of systems, past  medical/surgical history, family history, social history, and examination findings as documented by others; and I examined the patient myself.  I personally reviewed the relevant tests, images, and reports as documented above.  I formulated and edited as necessary the assessment and plan and discussed the findings and management plan with the patient and family. - Jason S. Goldberg, MD       Attending Physician Attestation:  Complete documentation of historical and exam elements from today's encounter can be found in the full encounter summary report (not reduplicated in this progress note).  I personally obtained the chief complaint(s) and history of present illness.  I confirmed and edited as necessary the review of systems, past medical/surgical history, family history, social history, and examination findings as documented by others; and I examined the patient myself.  I personally reviewed the relevant tests, images, and reports as documented above.  I formulated and edited as necessary the assessment and plan and discussed the findings and management plan with the patient and family. - Jose G Chand MD

## 2021-04-07 ENCOUNTER — OFFICE VISIT (OUTPATIENT)
Dept: OPHTHALMOLOGY | Facility: CLINIC | Age: 36
End: 2021-04-07
Attending: OPHTHALMOLOGY
Payer: COMMERCIAL

## 2021-04-07 DIAGNOSIS — B60.13 ACANTHAMOEBA KERATITIS: ICD-10-CM

## 2021-04-07 PROCEDURE — G0463 HOSPITAL OUTPT CLINIC VISIT: HCPCS

## 2021-04-07 PROCEDURE — 99024 POSTOP FOLLOW-UP VISIT: CPT | Mod: GC | Performed by: OPHTHALMOLOGY

## 2021-04-07 RX ORDER — PREDNISOLONE ACETATE 10 MG/ML
1 SUSPENSION/ DROPS OPHTHALMIC 3 TIMES DAILY
Qty: 15 ML | Refills: 1 | Status: SHIPPED | OUTPATIENT
Start: 2021-04-07 | End: 2021-05-07

## 2021-04-07 ASSESSMENT — SLIT LAMP EXAM - LIDS
COMMENTS: NORMAL
COMMENTS: NORMAL

## 2021-04-07 ASSESSMENT — VISUAL ACUITY
METHOD: SNELLEN - LINEAR
OS_PH_SC: 20/40
OS_SC: 20/60-
CORRECTION_TYPE: CONTACTS
OD_CC: 20/20-3

## 2021-04-07 ASSESSMENT — TONOMETRY
IOP_METHOD: ICARE
OS_IOP_MMHG: 11

## 2021-04-07 ASSESSMENT — EXTERNAL EXAM - RIGHT EYE: OD_EXAM: NORMAL

## 2021-04-07 ASSESSMENT — EXTERNAL EXAM - LEFT EYE: OS_EXAM: NORMAL

## 2021-04-07 NOTE — PROGRESS NOTES
CC: Acanthamoeba keratitis    Referring provider: Dr. Sarai Bassett    HPI:  Chandrakant Cruz is a(n) 35 year old female who was referred by Dr. Sarai Bassett for acanthamoeba keratitis. Now resolved with corneal scarring OS.   Patient is CL wearer. She has daily disposable CL's but will wear the lenses for multiple days at a time after they are placed in saline.     Interval Hx:   POW 1 s/p PTK left eye. Doing well. Occasional headache, no eye pain.  Vision improved, still blurry.    POHx:  H/o CTL wear   Acanthamoeba keratitis OS    Family hx of eye disease: No  Social Hx: (-) smoking, (social) EtOH, (-) illicit drugs    Meds:  PF q2h left eye  ofloxcin 4x/day  PFAT q1-2h     Surgical Hx:  None    A/P:  # Acanthamoeba keratitis, left eye  - symptoms began ~8/18, initially treated with Valtrex & moxifloxacin but had NOT received any topical steroids  - radial perineuritis evident on exam  - confocal 8/28 showing signet ring cells located in epithelium and superficial stroma, but did not appear in deeper stroma  - slit lamp photos taken upon presentation, but did not get saved unfortunately  - central 8 mm of epithelium debrided at slit lamp, and samples obtained for Acanthamoeba culture, KOH, gram stain, and routine anaerobic and aerobic culture (8/28/20) - culture positive for Acanthamoeba, otherwise only grew P acnes in broth only (unlikely pathogenic)  - started q1 hour PHMB on 8/28/20 (initially q1 hour while awake only as instructed, then around the clock starting 9/9  - 9/11: suspect initial scraping relieved active acanthamoeba trophozoites and subsequent worsening vision and pain due to encysted parasites waking up with robust inflammatory response as Acanthamoeba are dying VERSUS worsening of infection/ possible inadequate penetration of PHMB. Therefore increased PHMB concentration and started steroid  - Discussed if improving pain it is likely due to steroid and not necessarily improving infection, therefore need  ongoing close follow-up.  - 9/14: Epi defect HEALED. Vision improved. Infiltrate improved. Redness improved.  - 9/23: Epi defect remains healed. Vision stable. Infiltrate again improved.   9/28: Exam essentially stable with central cellular infiltrate.  10/9:  Improved symptoms on PF BID   10/26: Exam stable, forming a scar, vision continues to improve. STOPPED Pred.  10/30: Noted worsening redness, pain, and vision. Self increased PHMB back to q1h while awake.   - 11/2: New cell in AC. Infiltrate stable. No epi defect. 3+ PEE.  - 11/11: AC cell resolved. Scar stable.   - 11/25: Quiet, scar stable.  - 12/9: stable, exam unchanged.    - 2/22: stable, exam, unchanged. Pentacam flat with inferocentral thinning and ectasia OS - stable  - 4/2/21: POD 1 s/p PTK left eye 4/1/21 -- doing well, subjective haze resolved, now blurry (not epithelialized)  Reduced scar size.  TRANS-EPI ABLATION  microns at 6.5mm and peripheral treatment performed temporally 2mm spots between 4mm and 6.5mm zone.   - 4/7/21: POW1 s/p PTK left eye 4/1/21- improved haze, less dense scar, no epi defect. IOP 11. May not consider additional PTK given improvement vs additional PTK to reduce residual haze with concurrent PRK same day vs. PRK at later date.    - Meds:    - Taper PF TID left eye for 1 week, BID for 2 weeks, daily for week, then stop   - Stop Ofloxacin QID OS   - PFAT q1-2h    - UV protection    # Allergic conjunctivitis, both eyes   - pataday prn   - ok to use Flonase     #MGD each eye   - hold on warm compresses for now in setting of acanthamoeba keratitis.     Follow up:   RTC  Month VT with COLT MRx diagnostic    Marci Shearer MD  Ophthalmology PGY-3  [facility]    Attending Physician Attestation:  Complete documentation of historical and exam elements from today's encounter can be found in the full encounter summary report (not reduplicated in this progress note).  I personally obtained the chief complaint(s) and  history of present illness.  I confirmed and edited as necessary the review of systems, past medical/surgical history, family history, social history, and examination findings as documented by others; and I examined the patient myself.  I personally reviewed the relevant tests, images, and reports as documented above.  I formulated and edited as necessary the assessment and plan and discussed the findings and management plan with the patient and family. - Jose G Chand MD

## 2021-05-07 ENCOUNTER — OFFICE VISIT (OUTPATIENT)
Dept: OPHTHALMOLOGY | Facility: CLINIC | Age: 36
End: 2021-05-07
Attending: STUDENT IN AN ORGANIZED HEALTH CARE EDUCATION/TRAINING PROGRAM
Payer: COMMERCIAL

## 2021-05-07 DIAGNOSIS — H17.9 CORNEAL SCAR AND OPACITY: Primary | ICD-10-CM

## 2021-05-07 PROCEDURE — 92285 EXTERNAL OCULAR PHOTOGRAPHY: CPT | Performed by: STUDENT IN AN ORGANIZED HEALTH CARE EDUCATION/TRAINING PROGRAM

## 2021-05-07 PROCEDURE — 92132 CPTRZD OPH DX IMG ANT SGM: CPT | Performed by: STUDENT IN AN ORGANIZED HEALTH CARE EDUCATION/TRAINING PROGRAM

## 2021-05-07 PROCEDURE — G0463 HOSPITAL OUTPT CLINIC VISIT: HCPCS

## 2021-05-07 PROCEDURE — 99024 POSTOP FOLLOW-UP VISIT: CPT | Performed by: STUDENT IN AN ORGANIZED HEALTH CARE EDUCATION/TRAINING PROGRAM

## 2021-05-07 ASSESSMENT — TONOMETRY
OD_IOP_MMHG: CTL
IOP_METHOD: ICARE
OS_IOP_MMHG: 9

## 2021-05-07 ASSESSMENT — REFRACTION_MANIFEST
OS_CYLINDER: +0.50
OS_SPHERE: PLANO
OS_AXIS: 035

## 2021-05-07 ASSESSMENT — CONF VISUAL FIELD
METHOD: COUNTING FINGERS
OS_NORMAL: 1
OD_NORMAL: 1

## 2021-05-07 ASSESSMENT — VISUAL ACUITY
OD_CC+: -1
OS_SC: 20/40
OS_PH_SC: 20/30
OD_CC: 20/20
OS_SC+: +2
METHOD_MR: DIAGNOSTIC ONLY
METHOD: SNELLEN - LINEAR

## 2021-05-07 ASSESSMENT — EXTERNAL EXAM - RIGHT EYE: OD_EXAM: NORMAL

## 2021-05-07 ASSESSMENT — SLIT LAMP EXAM - LIDS
COMMENTS: NORMAL
COMMENTS: NORMAL

## 2021-05-07 ASSESSMENT — EXTERNAL EXAM - LEFT EYE: OS_EXAM: NORMAL

## 2021-05-07 NOTE — PROGRESS NOTES
CC: Acanthamoeba keratitis    Referring provider: Dr. Sarai Bassett    HPI:  Chandrakant Cruz is a(n) 35 year old female who was referred by Dr. Sarai Bassett for acanthamoeba keratitis. Now resolved with corneal scarring OS.   Patient is CL wearer. She has daily disposable CL's but will wear the lenses for multiple days at a time after they are placed in saline.     Interval Hx:   POM 1 s/p PTK left eye. Doing well. Vision stable / improved, no pain or redness, occasional intermittent aching.   Stopped PF 1 week ago.     POHx:  H/o CTL wear   Acanthamoeba keratitis OS    Family hx of eye disease: No  Social Hx: (-) smoking, (social) EtOH, (-) illicit drugs    Meds:  PFAT q1-2h     Surgical Hx:  None    A/P:  # Acanthamoeba keratitis, left eye  - symptoms began ~8/18, initially treated with Valtrex & moxifloxacin but had NOT received any topical steroids  - radial perineuritis evident on exam  - confocal 8/28 showing signet ring cells located in epithelium and superficial stroma, but did not appear in deeper stroma  - slit lamp photos taken upon presentation, but did not get saved unfortunately  - central 8 mm of epithelium debrided at slit lamp, and samples obtained for Acanthamoeba culture, KOH, gram stain, and routine anaerobic and aerobic culture (8/28/20) - culture positive for Acanthamoeba, otherwise only grew P acnes in broth only (unlikely pathogenic)  - started q1 hour PHMB on 8/28/20 (initially q1 hour while awake only as instructed, then around the clock starting 9/9  - 9/11: suspect initial scraping relieved active acanthamoeba trophozoites and subsequent worsening vision and pain due to encysted parasites waking up with robust inflammatory response as Acanthamoeba are dying VERSUS worsening of infection/ possible inadequate penetration of PHMB. Therefore increased PHMB concentration and started steroid  - Discussed if improving pain it is likely due to steroid and not necessarily improving infection, therefore  need ongoing close follow-up.  - 9/14: Epi defect HEALED. Vision improved. Infiltrate improved. Redness improved.  - 9/23: Epi defect remains healed. Vision stable. Infiltrate again improved.   9/28: Exam essentially stable with central cellular infiltrate.  10/9:  Improved symptoms on PF BID   10/26: Exam stable, forming a scar, vision continues to improve. STOPPED Pred.  10/30: Noted worsening redness, pain, and vision. Self increased PHMB back to q1h while awake.   - 11/2: New cell in AC. Infiltrate stable. No epi defect. 3+ PEE.  - 11/11: AC cell resolved. Scar stable.   - 11/25: Quiet, scar stable.  - 12/9: stable, exam unchanged.    - 2/22: stable, exam, unchanged. Pentacam flat with inferocentral thinning and ectasia OS - stable  - 4/2/21: POD 1 s/p PTK left eye 4/1/21 -- doing well, subjective haze resolved, now blurry (not epithelialized)  Reduced scar size.  TRANS-EPI ABLATION  microns at 6.5mm and peripheral treatment performed temporally 2mm spots between 4mm and 6.5mm zone.   - 4/7/21: POW1 s/p PTK left eye 4/1/21- improved haze, less dense scar, no epi defect. IOP 11. May not consider additional PTK given improvement vs additional PTK to reduce residual haze with concurrent PRK same day vs. PRK at later date.    - 5/7/21: Doing well, cornea healed and looking great post PTK.  Stopped PF about 1 week ago, residual haze s/p PTK seems to have faded some, but central 1mm area of mild-moderate haze still present.    - Discussed additional PTK +/- PRK session, will wait for Dr. Chand's return to schedule.   - Refracts to 20/25-3 today with 0.5 D cyl  - marisela, ant seg OCT and SLP obtained 5/7/21  - marisela with nasal flattening and thinning, thinnest local pach 377  - ant seg OCT demonstrating residual anterior stromal scar.    - Meds:    - PFAT q1-2h    - UV protection    # Allergic conjunctivitis, both eyes   - pataday prn   - ok to use Flonase     #MGD each eye   - hold on warm compresses for now in  setting of acanthamoeba keratitis.     Follow up:   RTC 4-6 weeks w/  COLT, Anterior Seg OCT -- surgical planning for repeat PTK +/- PRK left eye     Jason Goldberg, MD  Cornea & External Disease Fellow  Department of Ophthalmology and Visual Neurosciences      Attending Physician Attestation:  Complete documentation of historical and exam elements from today's encounter can be found in the full encounter summary report (not reduplicated in this progress note).  I personally obtained the chief complaint(s) and history of present illness.  I confirmed and edited as necessary the review of systems, past medical/surgical history, family history, social history, and examination findings as documented by others; and I examined the patient myself.  I personally reviewed the relevant tests, images, and reports as documented above.  I formulated and edited as necessary the assessment and plan and discussed the findings and management plan with the patient and family. - Jason S. Goldberg, MD

## 2021-05-07 NOTE — NURSING NOTE
Chief Complaints and History of Present Illnesses   Patient presents with     Follow Up     Chief Complaint(s) and History of Present Illness(es)     Follow Up     Laterality: left eye    Onset: sudden    Severity: moderate    Associated symptoms: Negative for redness, foreign body sensation, swelling, itching, flashes and floaters    Response to treatment: no improvement    Pain scale: 0/10              Comments     Chandrakant is here to continue care for Acanthamoeba keratitis - Left Eye. She feels vision is about the same as last visit. She still sometimes feels pain LE, but not now.    Larry Bravo COT 2:20 PM May 7, 2021

## 2021-05-28 NOTE — PROGRESS NOTES
FEMALE PREVENTIVE EXAM    Assessment & Plan   1. Routine general medical examination at a health care facility  Non-fasting labs and pap. If normal repeat in five years.   - Basic Metabolic Panel    2. Vegan diet  Check labs and advise on supplementation.  Recommended continuing B12 supplement regardless though may adjust dose.   - Vitamin B12  - Ferritin  - Iron and Transferrin Iron Binding Capacity  - HM2(CBC w/o Differential)  - Vitamin D, Total (25-Hydroxy)    3. Screening for cervical cancer  - Gynecologic Cytology (PAP Smear)    4. Screening for diabetes mellitus  - Glycosylated Hemoglobin A1c    Recommend repeat pap smear if normal every five years, Recommended adequate calcium intake/osteoporosis prevention, Discussed breast cancer screening guidelines and Discussed diet, including moderation of portions sizes, avoiding eating out and fast food and increase in fruits and vegetables    Julieta Mina CNP    Subjective:   Chief Complaint:  Establish Care and Annual Exam (not fasting - pap)    HPI:  Chandrakant Cruz is a 33 y.o. female who presents for routine physical exam.  She is a previous patient of Dr. Urrutia.  PMH negative for chronic concern.  She is  with a four year old daughter. She works as an .      Vegan diet.  No dairy products.  Taking B12 supplement daily.  Wondering about other supplements.  Training for half marathon.  Good energy levels and exercise tolerance.      ADHD:  Previously treated with stimulants.  Has not used medication for five years. Believes symptoms well controlled    OB/Gyn History:  Menstrual history: regular periods, slightly heavier with Paragard  Date of previous pap: 2014 without cotesting  History of abnormal pap: none  Current Contraceptive method: Paragard.     Preventive Health:  Reviewed and recommended screening and treatment recommendations:  Mammography: No [facility]  Colonoscopy: No [facility]  Immunizations: up to date    Health Habits:     Exercise: yes, running regularly.  Calcium intake/Osteoporosis prevention: yes    PMH:   Patient Active Problem List   Diagnosis     ADHD, Combined Type     Urinary Tract Infection     Pain During Urination (Dysuria)     Nausea With Vomiting     Pregnancy       No past medical history on file.    Current Medications: Reviewed   No current outpatient medications on file prior to visit.     No current facility-administered medications on file prior to visit.        Allergies:  Reviewed  has No Known Allergies.    Social History:  Social History     Occupational History     Not on file   Tobacco Use     Smoking status: Never Smoker     Smokeless tobacco: Never Used   Substance and Sexual Activity     Alcohol use: Never     Frequency: Never     Drug use: Never     Sexual activity: Not on file       Family History:   No family history on file.      Review of Systems:  Complete head to toe review of systems is otherwise negative except as above.    Objective:    /58 (Patient Site: Right Arm, Patient Position: Sitting, Cuff Size: Adult Regular)   Pulse 72     GENERAL: Alert, well-appearing female .   PSYCH: Pleasant mood, affect appropriate.   SKIN: No atypical lesions  EYES: Conjunctiva pink, sclera white, no exudates. TYREE.  EOMs intact.   EARS: TMs pearly grey, no bulging, redness, retraction.   MOUTH: Pharynx moist, pink without exudate. No tonsillar enlargement  NECK: No lymphadenopathy. Thyroid borders smooth without enlargement, nodules.   CV: Regular rate and rhythm without murmurs, rubs or gallops.  RESP: Lung sounds clear  ABDOMEN: BS+. Abdomen soft.  No organomegaly  BREASTS: Breasts symmetric, no dimpling, masses or skin discolorations seen. Areolas and nipples symmetric without discharge. On palpation, breast tissue supple and nontender. No masses or nodules. Axillary and epitrochlear lymph nodes nonpalpable.    FEMALE: External genitalia without lesions. Vaginal walls and cervix without lesions  or masses. No abnormal discharge. Pap smear obtained. On bimanual palpation, uterus mobile, normal shape and contour. No adnexal masses or tenderness.   PV :  No edema

## 2021-06-02 ENCOUNTER — OFFICE VISIT (OUTPATIENT)
Dept: OPHTHALMOLOGY | Facility: CLINIC | Age: 36
End: 2021-06-02
Attending: OPHTHALMOLOGY
Payer: COMMERCIAL

## 2021-06-02 DIAGNOSIS — H16.002 CORNEA ULCER, LEFT: ICD-10-CM

## 2021-06-02 DIAGNOSIS — H17.9 CORNEAL SCAR AND OPACITY: ICD-10-CM

## 2021-06-02 DIAGNOSIS — Z98.890 HISTORY OF PHOTOTHERAPEUTIC KERATECTOMY: ICD-10-CM

## 2021-06-02 DIAGNOSIS — Z98.890 POSTOPERATIVE EYE STATE: ICD-10-CM

## 2021-06-02 DIAGNOSIS — H17.9 CORNEAL SCAR AND OPACITY: Primary | ICD-10-CM

## 2021-06-02 PROCEDURE — 92025 CPTRIZED CORNEAL TOPOGRAPHY: CPT | Performed by: OPHTHALMOLOGY

## 2021-06-02 PROCEDURE — 68761 CLOSE TEAR DUCT OPENING: CPT | Performed by: OPHTHALMOLOGY

## 2021-06-02 PROCEDURE — 92132 CPTRZD OPH DX IMG ANT SGM: CPT | Performed by: OPHTHALMOLOGY

## 2021-06-02 PROCEDURE — G0463 HOSPITAL OUTPT CLINIC VISIT: HCPCS

## 2021-06-02 PROCEDURE — 99024 POSTOP FOLLOW-UP VISIT: CPT | Mod: GC | Performed by: OPHTHALMOLOGY

## 2021-06-02 ASSESSMENT — CONF VISUAL FIELD
OD_NORMAL: 1
OS_NORMAL: 1
METHOD: COUNTING FINGERS

## 2021-06-02 ASSESSMENT — VISUAL ACUITY
OS_PH_SC+: -2
OS_SC+: +2
CORRECTION_TYPE: CONTACTS
METHOD: SNELLEN - LINEAR
OD_CC: 20/20
OS_SC: 20/60 SLOW
OS_PH_SC: 20/30 SLOW

## 2021-06-02 ASSESSMENT — TONOMETRY
OD_IOP_MMHG: 17
OS_IOP_MMHG: 10
IOP_METHOD: ICARE

## 2021-06-02 ASSESSMENT — SLIT LAMP EXAM - LIDS
COMMENTS: NORMAL
COMMENTS: NORMAL

## 2021-06-02 ASSESSMENT — EXTERNAL EXAM - RIGHT EYE: OD_EXAM: NORMAL

## 2021-06-02 ASSESSMENT — PACHYMETRY: OS_CT(UM): 450

## 2021-06-02 ASSESSMENT — EXTERNAL EXAM - LEFT EYE: OS_EXAM: NORMAL

## 2021-06-02 NOTE — PROGRESS NOTES
CC: Acanthamoeba keratitis    Referring provider: Dr. Sarai Bassett    HPI:  Chandrakant Cruz is a(n) 35 year old female who was referred by Dr. Sarai Bassett for acanthamoeba keratitis. Now resolved with corneal scarring OS.   Patient is CL wearer. She has daily disposable CL's but will wear the lenses for multiple days at a time after they are placed in saline.     Interval Hx:   POM 2 s/p PTK left eye. Doing well. Vision stable / improved, no pain or redness, occasional intermittent aching.      POHx:  H/o CTL wear   Acanthamoeba keratitis OS  S/p PTK OS 4/1/2021 OS    Family hx of eye disease: No  Social Hx: (-) smoking, (social) EtOH, (-) illicit drugs    Meds:  PFAT q1-2h     Surgical Hx:  None    A/P:  # Acanthamoeba keratitis, left eye  - symptoms began ~8/18, initially treated with Valtrex & moxifloxacin but had NOT received any topical steroids  - radial perineuritis evident on exam  - confocal 8/28 showing signet ring cells located in epithelium and superficial stroma, but did not appear in deeper stroma  - slit lamp photos taken upon presentation, but did not get saved unfortunately  - central 8 mm of epithelium debrided at slit lamp, and samples obtained for Acanthamoeba culture, KOH, gram stain, and routine anaerobic and aerobic culture (8/28/20) - culture positive for Acanthamoeba, otherwise only grew P acnes in broth only (unlikely pathogenic)  - started q1 hour PHMB on 8/28/20 (initially q1 hour while awake only as instructed, then around the clock starting 9/9  - 9/11: suspect initial scraping relieved active acanthamoeba trophozoites and subsequent worsening vision and pain due to encysted parasites waking up with robust inflammatory response as Acanthamoeba are dying VERSUS worsening of infection/ possible inadequate penetration of PHMB. Therefore increased PHMB concentration and started steroid  - Discussed if improving pain it is likely due to steroid and not necessarily improving infection, therefore  need ongoing close follow-up.  - 9/14: Epi defect HEALED. Vision improved. Infiltrate improved. Redness improved.  - 9/23: Epi defect remains healed. Vision stable. Infiltrate again improved.   9/28: Exam essentially stable with central cellular infiltrate.  10/9:  Improved symptoms on PF BID   10/26: Exam stable, forming a scar, vision continues to improve. STOPPED Pred.  10/30: Noted worsening redness, pain, and vision. Self increased PHMB back to q1h while awake.   - 11/2: New cell in AC. Infiltrate stable. No epi defect. 3+ PEE.  - 11/11: AC cell resolved. Scar stable.   - 11/25: Quiet, scar stable.  - 12/9: stable, exam unchanged.    - 2/22: stable, exam, unchanged. Pentacam flat with inferocentral thinning and ectasia OS - stable  - 4/2/21: POD 1 s/p PTK left eye 4/1/21 -- doing well, subjective haze resolved, now blurry (not epithelialized)  Reduced scar size.  TRANS-EPI ABLATION  microns at 6.5mm and peripheral treatment performed temporally 2mm spots between 4mm and 6.5mm zone.   - 4/7/21: POW1 s/p PTK left eye 4/1/21- improved haze, less dense scar, no epi defect. IOP 11. May not consider additional PTK given improvement vs additional PTK to reduce residual haze with concurrent PRK same day vs. PRK at later date.    - 5/7/21: Doing well, cornea healed and looking great post PTK.  Stopped PF about 1 week ago, residual haze s/p PTK seems to have faded some, but central 1mm area of mild-moderate haze still present.    - Discussed additional PTK +/- PRK session. Eye still needs to heal. Will defer surgery for now  - Refracts to 20/25-3 today with 0.5 D cyl  - marisela, and SLP obtained 6/2/21  - marisela with temporal flattening and thinning, thinnest local pach 395  - ant seg OCT demonstrating residual anterior stromal scar.    - Meds:    - PFAT QID OS   - start celluvisc QHS OS    - UV protection    # Allergic conjunctivitis, both eyes   - pataday prn   - ok to use Flonase     #MGD each eye   - hold on warm  compresses for now in setting of acanthamoeba keratitis.     Follow up: 3 months, SLP, Diagnostic MRx      Aislinn Negro MD  Ophthalmology Resident, PGY-3     Attending Physician Attestation:  Complete documentation of historical and exam elements from today's encounter can be found in the full encounter summary report (not reduplicated in this progress note).  I personally obtained the chief complaint(s) and history of present illness.  I confirmed and edited as necessary the review of systems, past medical/surgical history, family history, social history, and examination findings as documented by others; and I examined the patient myself.  I personally reviewed the relevant tests, images, and reports as documented above.  I formulated and edited as necessary the assessment and plan and discussed the findings and management plan with the patient and family. I personally reviewed the ophthalmic test(s) associated with this encounter, agree with the interpretation(s) as documented by the resident/fellow, and have edited the corresponding report(s) as necessary. I was present for the key portions of the procedure and immediately available for the remainder. - Jose G Chand MD

## 2021-06-02 NOTE — NURSING NOTE
Chief Complaint(s) and History of Present Illness(es)     Follow Up     In left eye.  Associated symptoms include Negative for eye pain, redness, tearing and dryness.  Pain was noted as 0/10.              Comments     4 week f/u for Acanthamoeba keratitis, left eye. Pt notes the LE is about the same no changes to her vision. Pt denies any other changes or concerns.     Ocular meds:  PFAT Q1-2h BE  Pataday PRN BE    CHRIS Wright 8:40 AM June 2, 2021

## 2021-06-03 VITALS — BODY MASS INDEX: 21.55 KG/M2 | HEIGHT: 60 IN | WEIGHT: 109.75 LBS

## 2021-06-05 VITALS
WEIGHT: 118.19 LBS | DIASTOLIC BLOOD PRESSURE: 72 MMHG | OXYGEN SATURATION: 99 % | HEART RATE: 78 BPM | SYSTOLIC BLOOD PRESSURE: 100 MMHG | BODY MASS INDEX: 23.08 KG/M2

## 2021-06-16 NOTE — PROGRESS NOTES
Assessment & Plan   1. Urinary frequency  Urinalysis is normal.  Strong FMH of diabetes so will recheck A1C today.  She is also experiencing a sensation of pressure in her pelvic and is mildly discomfort to palpation throughout the abdomen on exam.  No fever or intense pain to suggest acute abdomen.  Will check labs. Discussed possibility of ovulatory discomfort as she is mid-cycle, ovarian cyst, overactive bladder.  If pain worsens, would order imaging. She feels comfortable with this plan.   - Urinalysis-UC if Indicated  - Glycosylated Hemoglobin A1c    2. Abdominal pain, generalized  As above  - HM2(CBC w/o Differential)  - Comprehensive Metabolic Panel    Julieta Mina CNP    Subjective   Chief Complaint:  Urinary Frequency (x 24-48 hours) and Abdominal Pain    HPI:   Chandrakant Cruz is a 35 y.o. female who presents for urinary frequency and abdominal pain.     She states symptoms began within the last day or two.  Noted she has been urinating frequently. No pain with urination but has noted a sense of pressure in her pelvis. This is not necessarily pain but feels odd.  She notes this is relieved with heat.  No hematuria, flank pain, fever chills.  No constipation.     She was once evaluated for kidney donation and told her kidneys were too large to donate (many years ago).  Renal function panel normal in 2019.      Periods are regular.  She is on day 15.  No h/o ovarian cysts.  No new partners.        Allergies:  has No Known Allergies.    SH/FH:  Social History and Family History reviewed and updated.   Tobacco Status:  She  reports that she has never smoked. She has never used smokeless tobacco.    Review of Systems:  A complete head to toe ROS is negative unless otherwise noted in HPI    Objective     Vitals:    03/17/21 1645   BP: 100/72   Patient Site: Left Arm   Patient Position: Sitting   Cuff Size: Adult Regular   Pulse: 78   SpO2: 99%   Weight: 118 lb 3 oz (53.6 kg)       Physical Exam:  GENERAL: Alert,  well-appearing  PSYCH: Pleasant mood, affect appropriate.    ABDOMEN: BS+. Abdomen soft. Mild generalized TTP throughout. No organomegaly, masses or hernias

## 2021-07-03 NOTE — ADDENDUM NOTE
Addendum Note by Rossy Burt CNP at 5/6/2019  1:40 PM     Author: Rossy Burt CNP Service: -- Author Type: Nurse Practitioner    Filed: 5/6/2019  4:50 PM Encounter Date: 5/6/2019 Status: Signed    : Rossy Burt CNP (Nurse Practitioner)    Addended by: ROSSY BURT on: 5/6/2019 04:50 PM        Modules accepted: Level of Service

## 2021-09-11 ENCOUNTER — HEALTH MAINTENANCE LETTER (OUTPATIENT)
Age: 36
End: 2021-09-11

## 2021-09-13 ENCOUNTER — OFFICE VISIT (OUTPATIENT)
Dept: OPHTHALMOLOGY | Facility: CLINIC | Age: 36
End: 2021-09-13
Attending: OPHTHALMOLOGY
Payer: COMMERCIAL

## 2021-09-13 DIAGNOSIS — Z98.890 HISTORY OF PHOTOTHERAPEUTIC KERATECTOMY: ICD-10-CM

## 2021-09-13 DIAGNOSIS — H17.9 CORNEAL SCAR AND OPACITY: ICD-10-CM

## 2021-09-13 DIAGNOSIS — H17.9 CORNEAL SCAR AND OPACITY: Primary | ICD-10-CM

## 2021-09-13 DIAGNOSIS — B60.13 ACANTHAMOEBA KERATITIS: ICD-10-CM

## 2021-09-13 DIAGNOSIS — H52.11 MYOPIA OF RIGHT EYE: ICD-10-CM

## 2021-09-13 PROCEDURE — G0463 HOSPITAL OUTPT CLINIC VISIT: HCPCS

## 2021-09-13 PROCEDURE — 92025 CPTRIZED CORNEAL TOPOGRAPHY: CPT | Performed by: OPHTHALMOLOGY

## 2021-09-13 PROCEDURE — 99215 OFFICE O/P EST HI 40 MIN: CPT | Mod: GC | Performed by: OPHTHALMOLOGY

## 2021-09-13 ASSESSMENT — REFRACTION_WEARINGRX
OS_CYLINDER: +0.25
OD_CYLINDER: SPHERE
OS_SPHERE: -4.00
OD_SPHERE: -3.25
OS_AXIS: 067
SPECS_TYPE: SVL

## 2021-09-13 ASSESSMENT — EXTERNAL EXAM - LEFT EYE: OS_EXAM: NORMAL

## 2021-09-13 ASSESSMENT — VISUAL ACUITY
CORRECTION_TYPE: CONTACTS
METHOD: SNELLEN - LINEAR
OS_SC+: -2
OS_SC: 20/50
OD_CC: 20/20
OD_CC+: -1

## 2021-09-13 ASSESSMENT — REFRACTION_MANIFEST
OS_CYLINDER: +1.00
OS_SPHERE: -1.50
OS_AXIS: 043

## 2021-09-13 ASSESSMENT — SLIT LAMP EXAM - LIDS
COMMENTS: NORMAL
COMMENTS: NORMAL

## 2021-09-13 ASSESSMENT — CONF VISUAL FIELD
OS_NORMAL: 1
OD_NORMAL: 1

## 2021-09-13 ASSESSMENT — TONOMETRY
OS_IOP_MMHG: 09
IOP_METHOD: TONOPEN

## 2021-09-13 ASSESSMENT — EXTERNAL EXAM - RIGHT EYE: OD_EXAM: NORMAL

## 2021-09-13 NOTE — NURSING NOTE
Chief Complaints and History of Present Illnesses   Patient presents with     Follow Up     Chief Complaint(s) and History of Present Illness(es)     Follow Up     Laterality: left eye    Course: stable    Associated symptoms: dryness.  Negative for eye pain, flashes and floaters              Comments     Pt here for a 3 month follow up for Corneal scar and opacity - Left Eye, and is s/p PTK left eye. Pt states both eyes are very dry and rarely does ache left eye.   Pt using  PF ATS 2-3 daily both eyes  CASEY BROWN 2:03 PM September 13, 2021

## 2021-09-13 NOTE — PROGRESS NOTES
CC: Acanthamoeba keratitis    Referring provider: Dr. Sarai Bassett    HPI:  Chandrakant Cruz is a(n) 35 year old female who was referred by Dr. Sarai Bassett for acanthamoeba keratitis. Now resolved with corneal scarring OS.   Patient is CL wearer. She has daily disposable CL's but will wear the lenses for multiple days at a time after they are placed in saline.     Interval Hx:   POM#5 s/p PTK OS. Doing well. Vision stable, pt denies any pain or redness.    POHx:  H/o CTL wear   Acanthamoeba keratitis OS  S/p PTK OS 4/1/2021 OS    Family hx of eye disease: No  Social Hx: (-) smoking, (social) EtOH, (-) illicit drugs    Meds:  PFAT q1-2h     Surgical Hx:  None    A/P:  # Acanthamoeba keratitis, left eye  - symptoms began ~8/18, initially treated with Valtrex & moxifloxacin but had NOT received any topical steroids  - radial perineuritis evident on exam  - confocal 8/28 showing signet ring cells located in epithelium and superficial stroma, but did not appear in deeper stroma  - slit lamp photos taken upon presentation, but did not get saved unfortunately  - central 8 mm of epithelium debrided at slit lamp, and samples obtained for Acanthamoeba culture, KOH, gram stain, and routine anaerobic and aerobic culture (8/28/20) - culture positive for Acanthamoeba, otherwise only grew P acnes in broth only (unlikely pathogenic)  - started q1 hour PHMB on 8/28/20 (initially q1 hour while awake only as instructed, then around the clock starting 9/9  - 9/11: suspect initial scraping relieved active acanthamoeba trophozoites and subsequent worsening vision and pain due to encysted parasites waking up with robust inflammatory response as Acanthamoeba are dying VERSUS worsening of infection/ possible inadequate penetration of PHMB. Therefore increased PHMB concentration and started steroid  - Discussed if improving pain it is likely due to steroid and not necessarily improving infection, therefore need ongoing close follow-up.  - 9/14: Epi  defect HEALED. Vision improved. Infiltrate improved. Redness improved.  - 9/23: Epi defect remains healed. Vision stable. Infiltrate again improved.   9/28: Exam essentially stable with central cellular infiltrate.  10/9:  Improved symptoms on PF BID   10/26: Exam stable, forming a scar, vision continues to improve. STOPPED Pred.  10/30: Noted worsening redness, pain, and vision. Self increased PHMB back to q1h while awake.   - 11/2: New cell in AC. Infiltrate stable. No epi defect. 3+ PEE.  - 11/11: AC cell resolved. Scar stable.   - 11/25: Quiet, scar stable.  - 12/9: stable, exam unchanged.    - 2/22: stable, exam, unchanged. Pentacam flat with inferocentral thinning and ectasia OS - stable  - 4/2/21: POD 1 s/p PTK left eye 4/1/21 -- doing well, subjective haze resolved, now blurry (not epithelialized)  Reduced scar size.  TRANS-EPI ABLATION  microns at 6.5mm and peripheral treatment performed temporally 2mm spots between 4mm and 6.5mm zone.   - 4/7/21: POW1 s/p PTK left eye 4/1/21- improved haze, less dense scar, no epi defect. IOP 11. May not consider additional PTK given improvement vs additional PTK to reduce residual haze with concurrent PRK same day vs. PRK at later date.    - 5/7/21: Doing well, cornea healed and looking great post PTK.  Stopped PF about 1 week ago, residual haze s/p PTK seems to have faded some, but central 1mm area of mild-moderate haze still present.    - K marisela with temporal flattening and thinning, thinnest local pach 395 OS  - may benefit from 3.0mm 20um ablation superotemporal followed by PRK OS  - Discussed additional PTK +/- PRK session OS - monitor for now  - discussed possible scleral lens trial OS - patient to see Dr. Gutierrez  - patient interested is PRK OD  - discussed R/B/A including dry eyes, glare, halos, infection, ectasia, loss of vision, and loss of the eye  - recommend contact lens holiday for 7 days, then repeat K marisela OD, MRx OD, CRx, pachy OD for PRK planning      - Meds:    - PFAT QID OS   - start celluvisc QHS OS    - UV protection    # Allergic conjunctivitis, both eyes   - pataday prn   - ok to use Flonase     #MGD each eye   - hold on warm compresses for now in setting of acanthamoeba keratitis.     Follow up: Dr. Gutierrez for contact lens trial    Lino Jain, DO  Fellow, Cornea & External Disease  Department of Ophthalmology  [facility]    Attending Physician Attestation:  Complete documentation of historical and exam elements from today's encounter can be found in the full encounter summary report (not reduplicated in this progress note).  I personally obtained the chief complaint(s) and history of present illness.  I confirmed and edited as necessary the review of systems, past medical/surgical history, family history, social history, and examination findings as documented by others; and I examined the patient myself.  I personally reviewed the relevant tests, images, and reports as documented above.  I formulated and edited as necessary the assessment and plan and discussed the findings and management plan with the patient and family. I personally reviewed the ophthalmic test(s) associated with this encounter, agree with the interpretation(s) as documented by the resident/fellow, and have edited the corresponding report(s) as necessary. - Jose G Chand MD    I personally spent great than 40min with the patient, of which >50% of the time was spent face to face with the patient, counseling and coordinating care with the patient. We discussed the complexity of her diagnosis, the need for further information prior to proceeding with yet another surgery, and the unknown prognosis for the patient at this time.    Jose G Chand MD

## 2021-09-27 ENCOUNTER — OFFICE VISIT (OUTPATIENT)
Dept: OPTOMETRY | Facility: CLINIC | Age: 36
End: 2021-09-27
Payer: COMMERCIAL

## 2021-09-27 ENCOUNTER — ALLIED HEALTH/NURSE VISIT (OUTPATIENT)
Dept: OPHTHALMOLOGY | Facility: CLINIC | Age: 36
End: 2021-09-27
Attending: OPHTHALMOLOGY
Payer: COMMERCIAL

## 2021-09-27 DIAGNOSIS — H52.11 MYOPIA OF RIGHT EYE: Primary | ICD-10-CM

## 2021-09-27 DIAGNOSIS — H52.212 IRREGULAR ASTIGMATISM OF LEFT EYE: ICD-10-CM

## 2021-09-27 DIAGNOSIS — H17.9 CORNEAL SCAR AND OPACITY: Primary | ICD-10-CM

## 2021-09-27 PROCEDURE — 99207 PR NO CHARGE COORDINATED CARE PS: CPT

## 2021-09-27 ASSESSMENT — REFRACTION_MANIFEST
OS_CYLINDER: +1.00
OD_AXIS: 150
OS_SPHERE: -1.50
OD_SPHERE: -3.25
OS_AXIS: 043
OD_CYLINDER: +0.25

## 2021-09-27 ASSESSMENT — REFRACTION_WEARINGRX
SPECS_TYPE: SVL
SPECS_TYPE: SVL
OS_CYLINDER: +0.25
OD_CYLINDER: SPHERE
OS_AXIS: 077
OS_SPHERE: -4.00
OS_CYLINDER: +0.25
OD_SPHERE: -3.25
OS_AXIS: 077
OS_SPHERE: -4.00
OD_CYLINDER: SPHERE
OD_SPHERE: -3.25

## 2021-09-27 ASSESSMENT — VISUAL ACUITY
CORRECTION_TYPE: GLASSES
METHOD: SNELLEN - LINEAR
OD_CC: 20/20
OS_SC+: -2
OS_SC: 20/60
CORRECTION_TYPE: GLASSES
OS_PH_SC: 20/25
OD_CC: 20/20
METHOD: SNELLEN - LINEAR
OS_CC: 20/60-2
OS_PH_SC+: -1

## 2021-09-27 ASSESSMENT — EXTERNAL EXAM - RIGHT EYE: OD_EXAM: NORMAL

## 2021-09-27 ASSESSMENT — REFRACTION
OD_SPHERE: -3.25
OD_CYLINDER: SPHERE

## 2021-09-27 ASSESSMENT — REFRACTION_CURRENTRX
OS_BRAND: RGP
OS_DIAMETER: 9.5
OS_DIAMETER: 14.9
OS_BASECURVE: 7.8
OS_SPHERE: -2.50
OS_SPHERE: -3.00
OS_BRAND: ONEFIT
OS_BASECURVE: 44.00

## 2021-09-27 ASSESSMENT — PACHYMETRY: OD_CT(UM): 567

## 2021-09-27 ASSESSMENT — SLIT LAMP EXAM - LIDS
COMMENTS: NORMAL
COMMENTS: NORMAL

## 2021-09-27 ASSESSMENT — EXTERNAL EXAM - LEFT EYE: OS_EXAM: NORMAL

## 2021-09-27 NOTE — PROGRESS NOTES
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 Immediately poor comfort with RGP trial today    Discussed options. She would like to proceed with scleral lens left eye. Rec Clearcare and single use salines only to fill given history. She would like lens prior to PRK so she can have a functional eye during recovery period.    Order lens, RTC 2 week lens dispense, I&R

## 2021-10-13 ENCOUNTER — OFFICE VISIT (OUTPATIENT)
Dept: OPTOMETRY | Facility: CLINIC | Age: 36
End: 2021-10-13
Payer: COMMERCIAL

## 2021-10-13 DIAGNOSIS — H17.9 CORNEAL SCAR AND OPACITY: Primary | ICD-10-CM

## 2021-10-13 DIAGNOSIS — H52.212 IRREGULAR ASTIGMATISM OF LEFT EYE: ICD-10-CM

## 2021-10-13 ASSESSMENT — EXTERNAL EXAM - RIGHT EYE: OD_EXAM: NORMAL

## 2021-10-13 ASSESSMENT — VISUAL ACUITY
OD_CC: 20/20
CORRECTION_TYPE: CONTACTS
OS_SC: 20/100
METHOD: SNELLEN - LINEAR

## 2021-10-13 ASSESSMENT — REFRACTION_CURRENTRX
OS_BASECURVE: 7.7
OS_SPHERE: -2.75
OS_DIAMETER: 14.9
OS_ADDL_SPECS: OPT EXTRA CLEAR
OS_BRAND: ONEFIT

## 2021-10-13 ASSESSMENT — EXTERNAL EXAM - LEFT EYE: OS_EXAM: NORMAL

## 2021-10-13 ASSESSMENT — SLIT LAMP EXAM - LIDS
COMMENTS: NORMAL
COMMENTS: NORMAL

## 2021-10-13 NOTE — PROGRESS NOTES
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 No changes recommended at this time  -Successful I&R, reviewed CL care and hygiene with pt (Clearcare, Purilens vs PF saline vials)    Lens dispensed. F/u prn with any vision/fit concerns, otherwise okay to monitor annually given excellent response today. Continue with daily disposable soft lens until PRK surgery right eye    Contact Lens Billing  V-Code:  - GP scleral  Final Contact Lens Rx       Brand Base Curve Diameter Sphere Lens Addl. Specs    Right          Left Onefit  7.7 14.9 -2.75 std edge Opt Extra clear         # of units: 1  Price per Unit: $215    This patient requires contact lenses that are medically necessary for either improvement in vision over spectacles, support of the ocular surface, or other therapeutic benefit. These are not cosmetic contact lenses.     Encounter Diagnoses   Name Primary?     Corneal scar and opacity Yes     Irregular astigmatism of left eye         30+ min spent on the date of encounter in direct care/counseling/review of charts/prescribing/documentation

## 2022-01-01 ENCOUNTER — HEALTH MAINTENANCE LETTER (OUTPATIENT)
Age: 37
End: 2022-01-01

## 2022-10-30 ENCOUNTER — HEALTH MAINTENANCE LETTER (OUTPATIENT)
Age: 37
End: 2022-10-30

## 2023-04-08 ENCOUNTER — HEALTH MAINTENANCE LETTER (OUTPATIENT)
Age: 38
End: 2023-04-08

## 2023-05-24 ENCOUNTER — ANCILLARY PROCEDURE (OUTPATIENT)
Dept: GENERAL RADIOLOGY | Facility: CLINIC | Age: 38
End: 2023-05-24
Attending: PODIATRIST
Payer: COMMERCIAL

## 2023-05-24 ENCOUNTER — OFFICE VISIT (OUTPATIENT)
Dept: PODIATRY | Facility: CLINIC | Age: 38
End: 2023-05-24
Payer: COMMERCIAL

## 2023-05-24 VITALS
BODY MASS INDEX: 23.05 KG/M2 | SYSTOLIC BLOOD PRESSURE: 127 MMHG | WEIGHT: 118 LBS | HEART RATE: 76 BPM | DIASTOLIC BLOOD PRESSURE: 71 MMHG

## 2023-05-24 DIAGNOSIS — M72.2 PLANTAR FASCIITIS, LEFT: ICD-10-CM

## 2023-05-24 DIAGNOSIS — M25.572 ACUTE LEFT ANKLE PAIN: ICD-10-CM

## 2023-05-24 DIAGNOSIS — M25.476 EFFUSION OF SUBTALAR JOINT: Primary | ICD-10-CM

## 2023-05-24 DIAGNOSIS — M21.6X2 ACQUIRED PES VALGUS, LEFT: ICD-10-CM

## 2023-05-24 PROCEDURE — 73630 X-RAY EXAM OF FOOT: CPT | Mod: TC | Performed by: RADIOLOGY

## 2023-05-24 PROCEDURE — 73600 X-RAY EXAM OF ANKLE: CPT | Mod: TC | Performed by: RADIOLOGY

## 2023-05-24 PROCEDURE — 99204 OFFICE O/P NEW MOD 45 MIN: CPT | Performed by: PODIATRIST

## 2023-05-24 NOTE — PROGRESS NOTES
Assessment:      ICD-10-CM    1. Effusion of subtalar joint  M25.476 diclofenac (VOLTAREN) 50 MG EC tablet      2. Acquired pes valgus, left  M21.6X2 diclofenac (VOLTAREN) 50 MG EC tablet      3. Plantar fasciitis, left  M72.2 diclofenac (VOLTAREN) 50 MG EC tablet             Plan:  Orders Placed This Encounter   Procedures     XR Ankle Left 2 Views     XR Foot Left G/E 3 Views       Discussed the etiology and treatment of the condition with the patient.  Imaging studies reviewed and discussed with the patient.  Discussed surgical and conservative options.    STJ, TNJ pain - increased running and flat foot    No fracture    -Injection- to STJ or TNJ if not improved  -NSAID- Rx po, topical  -Immobilization- boot discussed  -Orthoses- SuperFeet; customs discussed if needed  -Activity- reduce impact; ok to run if not painful  -WB- full    MRI if not improved    Return:  No follow-ups on file.                Chief Complaint:     Patient presents with:  Left Ankle - Pain  Left Foot - Pain         HPI:  Chandrakant Cruz is a 37 year old year old female who presents for evaluation of ankle pain.    Pain location- points to arc underneath ankle on L    STj and TNJ L  No injury but increased mileage training for [employer] marathon  Doesn't use orthotics  Asics gel kayano        Past Medical & Surgical History:  Past Medical History:   Diagnosis Date     Acanthamoeba keratitis      ADD (attention deficit disorder with hyperactivity)       Past Surgical History:   Procedure Laterality Date     none        Family History   Problem Relation Age of Onset     Glaucoma Maternal Grandmother      No Known Problems Mother      Diabetes Father      Heart Failure Father      Macular Degeneration No family hx of      Kidney Disease Father      Hypertension Father      Early Death Father      Heart Disease Father      Hyperlipidemia Father      Cancer Paternal Aunt         lymphoma     Cancer Paternal Uncle         bone cancer      Diabetes  Maternal Grandfather      Clotting Disorder Maternal Uncle         Social History:  ?  History   Smoking Status     Never   Smokeless Tobacco     Never     History   Drug Use No     Social History    Substance and Sexual Activity      Alcohol use: Not Currently      Allergies:  ?   Allergies   Allergen Reactions     Nkda [No Known Drug Allergy]         Medications:    Current Outpatient Medications   Medication     cetirizine (ZYRTEC) 10 MG tablet     diclofenac (VOLTAREN) 50 MG EC tablet     multivitamin w/minerals (MULTI-VITAMIN) tablet     polyhexamethylene biguanide 0.2mg/mL (BAQUACIL) 0.2mg/ml compounded ophthalmic solution     No current facility-administered medications for this visit.         Physical Exam:  ?  Vitals:  /71   Pulse 76   Wt 53.5 kg (118 lb)   BMI 23.05 kg/m     General:  WD/WN, in NAD.  A&O x3.  Dermatologic:    Skin is intact, open lesions absent.   Skin texture, turgor is normal.  Vascular:  Pulses palpable.  Digital capillary refill time normal.  Skin temperature is normal.  Generalized edema- none.  Focal edema- mild dorsal midfoot over TNJ left.  Neurologic:    Gross sensation normal.  Gait and balance normal.  Musculoskeletal:  Maximal pain to palpation of dorsal medial TNJ, left.  moderate pain to palpation of sinus tarsi, left.  Ankle ROM smooth, nonpainful left.  STJ, ROM full, pain free left.  MTJ ROM mildly painful, L  Muscle strength 5/5  foot and ankle bilateral.    Stance:  RCSP Valgus bilateral.  Foot type:  Flexible valgus        Imaging:   x-ray independently reviewed and interpreted by myself today.  Weight-bearing views left foot dated 05/24/23, reveal moderate flatfoot, no appreciable coalition, no STJ or MTJ degeneration,  no navicular Fx.  Mild ant ankle impingement on MO.

## 2023-05-24 NOTE — PATIENT INSTRUCTIONS
PATIENT INSTRUCTIONS - Podiatry / Foot & Ankle Surgery    Diclofenac / Voltaren - 1 pill twice daily x1 week.  Then take a 1 week break.  Repeat as needed.  Take with food & an acid blocker if stomach upset occurs.  Stop all other NSAIDs (aspirin, ibuprofen/Motrin, naproxen/ Aleve).          SuperFeet orthotics  SuperFeet are over the counter (OTC), you do not need a prescription.  Wear Superfeet orthotics in all of your shoes.  Remove the existing liner and replace it with SuperFeet.    SuperFeet are available on [employer], at [employer] and most specialty [employer].  Blue or berry    Ankle Brace:    Use an ankle brace for the next few months for athletic or more significant activity, especially on uneven ground   Ankle compression sleeve - Samaria ([employer]), [employer] (pharmacy, retail stores)        If not improved - MRI - call, boot

## 2023-05-24 NOTE — LETTER
5/24/2023         RE: Chandrakant Cruz  [address] [facility] [address]        Dear Colleague,    Thank you for referring your patient, Chandrakant Cruz, to the [facility]. Please see a copy of my visit note below.      Assessment:      ICD-10-CM    1. Effusion of subtalar joint  M25.476 diclofenac (VOLTAREN) 50 MG EC tablet      2. Acquired pes valgus, left  M21.6X2 diclofenac (VOLTAREN) 50 MG EC tablet      3. Plantar fasciitis, left  M72.2 diclofenac (VOLTAREN) 50 MG EC tablet             Plan:  Orders Placed This Encounter   Procedures     XR Ankle Left 2 Views     XR Foot Left G/E 3 Views       Discussed the etiology and treatment of the condition with the patient.  Imaging studies reviewed and discussed with the patient.  Discussed surgical and conservative options.    STJ, TNJ pain - increased running and flat foot    No fracture    -Injection- to STJ or TNJ if not improved  -NSAID- Rx po, topical  -Immobilization- boot discussed  -Orthoses- SuperFeet; customs discussed if needed  -Activity- reduce impact; ok to run if not painful  -WB- full    MRI if not improved    Return:  No follow-ups on file.                Chief Complaint:     Patient presents with:  Left Ankle - Pain  Left Foot - Pain         HPI:  Chandrakant Cruz is a 37 year old year old female who presents for evaluation of ankle pain.    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  Doesn't use orthotics  Asics gel kayano        Past Medical & Surgical History:  Past Medical History:   Diagnosis Date     Acanthamoeba keratitis      ADD (attention deficit disorder with hyperactivity)       Past Surgical History:   Procedure Laterality Date     none        Family History   Problem Relation Age of Onset     Glaucoma Maternal Grandmother      No Known Problems Mother      Diabetes Father      Heart Failure Father      Macular Degeneration No family  hx of      Kidney Disease Father      Hypertension Father      Early Death Father      Heart Disease Father      Hyperlipidemia Father      Cancer Paternal Aunt         lymphoma     Cancer Paternal Uncle         bone cancer      Diabetes Maternal Grandfather      Clotting Disorder Maternal Uncle         Social History:  ?  History   Smoking Status     Never   Smokeless Tobacco     Never     History   Drug Use No     Social History    Substance and Sexual Activity      Alcohol use: Not Currently      Allergies:  ?   Allergies   Allergen Reactions     Nkda [No Known Drug Allergy]         Medications:    Current Outpatient Medications   Medication     cetirizine (ZYRTEC) 10 MG tablet     diclofenac (VOLTAREN) 50 MG EC tablet     multivitamin w/minerals (MULTI-VITAMIN) tablet     polyhexamethylene biguanide 0.2mg/mL (BAQUACIL) 0.2mg/ml compounded ophthalmic solution     No current facility-administered medications for this visit.         Physical Exam:  ?  Vitals:  /71   Pulse 76   Wt 53.5 kg (118 lb)   BMI 23.05 kg/m     General:  WD/WN, in NAD.  A&O x3.  Dermatologic:    Skin is intact, open lesions absent.   Skin texture, turgor is normal.  Vascular:  Pulses palpable.  Digital capillary refill time normal.  Skin temperature is normal.  Generalized edema- none.  Focal edema- mild dorsal midfoot over TNJ left.  Neurologic:    Gross sensation normal.  Gait and balance normal.  Musculoskeletal:  Maximal pain to palpation of dorsal medial TNJ, left.  moderate pain to palpation of sinus tarsi, left.  Ankle ROM smooth, nonpainful left.  STJ, ROM full, pain free left.  MTJ ROM mildly painful, L  Muscle strength 5/5  foot and ankle bilateral.    Stance:  RCSP Valgus bilateral.  Foot type:  Flexible valgus        Imaging:   x-ray independently reviewed and interpreted by myself today.  Weight-bearing views left foot dated 05/24/23, reveal moderate flatfoot, no appreciable coalition, no STJ or MTJ degeneration,  no  navicular Fx.  Mild ant ankle impingement on MO.            Again, thank you for allowing me to participate in the care of your patient.        Sincerely,        Batsheva Romo DPM

## 2023-06-11 ENCOUNTER — MYC MEDICAL ADVICE (OUTPATIENT)
Dept: PODIATRY | Facility: CLINIC | Age: 38
End: 2023-06-11
Payer: COMMERCIAL

## 2023-06-11 DIAGNOSIS — M25.476 EFFUSION OF SUBTALAR JOINT: Primary | ICD-10-CM

## 2023-06-11 DIAGNOSIS — M21.6X2 ACQUIRED PES VALGUS, LEFT: ICD-10-CM

## 2023-06-11 DIAGNOSIS — M72.2 PLANTAR FASCIITIS, LEFT: ICD-10-CM

## 2023-06-12 NOTE — TELEPHONE ENCOUNTER
Pt last seen 5/24/23 for effusion of subtalar joint. P tis still experiencing pain with walking and is unable to run. Finished voltaren, using brace and orthotics. No improvement.     Next step would be order an MRI.     Please advise.     PJ Giles

## 2023-06-21 ENCOUNTER — OFFICE VISIT (OUTPATIENT)
Dept: PODIATRY | Facility: CLINIC | Age: 38
End: 2023-06-21
Payer: COMMERCIAL

## 2023-06-21 VITALS
HEART RATE: 63 BPM | WEIGHT: 118 LBS | DIASTOLIC BLOOD PRESSURE: 68 MMHG | BODY MASS INDEX: 23.05 KG/M2 | SYSTOLIC BLOOD PRESSURE: 111 MMHG

## 2023-06-21 DIAGNOSIS — M25.476 EFFUSION OF SUBTALAR JOINT: Primary | ICD-10-CM

## 2023-06-21 DIAGNOSIS — M21.6X2 ACQUIRED PES VALGUS, LEFT: ICD-10-CM

## 2023-06-21 DIAGNOSIS — M72.2 PLANTAR FASCIITIS, LEFT: ICD-10-CM

## 2023-06-21 PROCEDURE — 20605 DRAIN/INJ JOINT/BURSA W/O US: CPT | Mod: LT | Performed by: PODIATRIST

## 2023-06-21 RX ORDER — BUPIVACAINE HYDROCHLORIDE 5 MG/ML
1 INJECTION, SOLUTION PERINEURAL ONCE
Status: COMPLETED | OUTPATIENT
Start: 2023-06-21 | End: 2023-06-21

## 2023-06-21 RX ORDER — TRIAMCINOLONE ACETONIDE 40 MG/ML
40 INJECTION, SUSPENSION INTRA-ARTICULAR; INTRAMUSCULAR ONCE
Status: COMPLETED | OUTPATIENT
Start: 2023-06-21 | End: 2023-06-21

## 2023-06-21 RX ORDER — DEXAMETHASONE SODIUM PHOSPHATE 4 MG/ML
4 INJECTION, SOLUTION INTRA-ARTICULAR; INTRALESIONAL; INTRAMUSCULAR; INTRAVENOUS; SOFT TISSUE ONCE
Status: COMPLETED | OUTPATIENT
Start: 2023-06-21 | End: 2023-06-21

## 2023-06-21 RX ADMIN — TRIAMCINOLONE ACETONIDE 40 MG: 40 INJECTION, SUSPENSION INTRA-ARTICULAR; INTRAMUSCULAR at 10:45

## 2023-06-21 RX ADMIN — DEXAMETHASONE SODIUM PHOSPHATE 4 MG: 4 INJECTION, SOLUTION INTRA-ARTICULAR; INTRALESIONAL; INTRAMUSCULAR; INTRAVENOUS; SOFT TISSUE at 10:45

## 2023-06-21 RX ADMIN — BUPIVACAINE HYDROCHLORIDE 5 MG: 5 INJECTION, SOLUTION PERINEURAL at 10:45

## 2023-06-21 NOTE — LETTER
6/21/2023         RE: Chandrakant Cruz  [address] [facility] [address]        Dear Colleague,    Thank you for referring your patient, Chandrakant Cruz, to the [facility]. Please see a copy of my visit note below.      Assessment:      ICD-10-CM    1. Effusion of subtalar joint  M25.476 triamcinolone (KENALOG-40) injection 40 mg     dexamethasone (DECADRON) injection 4 mg     Bupivacaine 0.5 % Injection     DRAIN/INJECT MEDIUM JOINT/BURSA      2. Acquired pes valgus, left  M21.6X2       3. Plantar fasciitis, left  M72.2              Plan:  Orders Placed This Encounter   Procedures     DRAIN/INJECT MEDIUM JOINT/BURSA       Discussed the etiology and treatment of the condition with the patient.  Imaging studies reviewed and discussed with the patient.  Discussed surgical and conservative options.    STJ pain - increased running and flat foot      -Injection-   Procedure:  injection  PARQ session held, verbal consent obtained.  Sterile skin prep.    Location:  Left STJ  Contents:  3ml total, marcaine, kenalog-40, dexamethasone phosphate.  Dressing applied.    Post-injection instructions reviewed including close attention to amount and duration of pain relief.    Continue prior      -NSAID- Rx po, topical  -Immobilization- boot discussed  -Orthoses- SuperFeet; customs discussed if needed  -Activity- reduce impact; ok to run if not painful  -WB- full    MRI if not improved    Return:  No follow-ups on file.                Chief Complaint:     Patient presents with:  Right Foot - RECHECK  Left Foot - RECHECK         HPI:  Chandrakant Cruz is a 37 year old year old female who presents for evaluation of ankle pain.      6/21  Still painful, has not run yet  Cycling, lifting        Pain location- points to arc underneath ankle on L    STj and TNJ L  No injury but increased mileage training for [employer] marathon  Doesn't use orthotics  Asics gel kayano        Past Medical & Surgical History:  Past Medical  History:   Diagnosis Date     Acanthamoeba keratitis      ADD (attention deficit disorder with hyperactivity)       Past Surgical History:   Procedure Laterality Date     none        Family History   Problem Relation Age of Onset     Glaucoma Maternal Grandmother      No Known Problems Mother      Diabetes Father      Heart Failure Father      Macular Degeneration No family hx of      Kidney Disease Father      Hypertension Father      Early Death Father      Heart Disease Father      Hyperlipidemia Father      Cancer Paternal Aunt         lymphoma     Cancer Paternal Uncle         bone cancer      Diabetes Maternal Grandfather      Clotting Disorder Maternal Uncle         Social History:  ?  History   Smoking Status     Never   Smokeless Tobacco     Never     History   Drug Use No     Social History    Substance and Sexual Activity      Alcohol use: Not Currently      Allergies:  ?   Allergies   Allergen Reactions     Nkda [No Known Drug Allergy]         Medications:    Current Outpatient Medications   Medication     cetirizine (ZYRTEC) 10 MG tablet     diclofenac (VOLTAREN) 50 MG EC tablet     multivitamin w/minerals (MULTI-VITAMIN) tablet     polyhexamethylene biguanide 0.2mg/mL (BAQUACIL) 0.2mg/ml compounded ophthalmic solution     No current facility-administered medications for this visit.         Physical Exam:  ?  Vitals:  /68   Pulse 63   Wt 53.5 kg (118 lb)   BMI 23.05 kg/m     General:  WD/WN, in NAD.  A&O x3.  Dermatologic:    Skin is intact, open lesions absent.   Skin texture, turgor is normal.  Vascular:  Pulses palpable.  Digital capillary refill time normal.  Skin temperature is normal.  Generalized edema- none.  Focal edema- mild dorsal midfoot over TNJ left.  Neurologic:    Gross sensation normal.  Gait and balance normal.  Musculoskeletal:  Maximal pain to palpation of dorsal medial TNJ, left.  moderate pain to palpation of sinus tarsi, left.  Ankle ROM smooth, nonpainful left.  STJ, ROM  full, pain free left.  MTJ ROM mildly painful, L  Muscle strength 5/5  foot and ankle bilateral.    Stance:  RCSP Valgus bilateral.  Foot type:  Flexible valgus        Imaging:   x-ray independently reviewed and interpreted by myself today.  Weight-bearing views left foot dated 05/24/23, reveal moderate flatfoot, no appreciable coalition, no STJ or MTJ degeneration,  no navicular Fx.  Mild ant ankle impingement on MO.            Again, thank you for allowing me to participate in the care of your patient.        Sincerely,        Batsheva Romo DPM

## 2023-06-21 NOTE — PATIENT INSTRUCTIONS
"PATIENT INSTRUCTIONS - Podiatry / Foot & Ankle Surgery    Aftercare for Steroid Injection  Activity:  -Resume normal activity as tolerated.  -Tendon, ligament, fascia injectionons- avoid high-impact activity for 1 week.  Icing:  -May apply to the injection site if needed.  Infection:  -Risk is generally low (<1%), but must be aware of the signs/symptoms.    -Both infection and an inflammatory reaction to the steroid (\"steroid flare\") will cause redness, warmth, and increased pain.   -If these symptoms develop within 12-24h & resolve within 2-3 days- \"steroid flare\"- ice (non-worrisome)  -If these symptoms develop after 24-48h, progressively get worse, & are associated with a fever- possible infection; call the clinic or present to urgent care immediately      "

## 2023-07-12 ENCOUNTER — OFFICE VISIT (OUTPATIENT)
Dept: PODIATRY | Facility: CLINIC | Age: 38
End: 2023-07-12
Payer: COMMERCIAL

## 2023-07-12 VITALS
HEART RATE: 75 BPM | WEIGHT: 118 LBS | BODY MASS INDEX: 23.05 KG/M2 | SYSTOLIC BLOOD PRESSURE: 103 MMHG | DIASTOLIC BLOOD PRESSURE: 68 MMHG

## 2023-07-12 DIAGNOSIS — M21.6X2 ACQUIRED PES VALGUS, LEFT: ICD-10-CM

## 2023-07-12 DIAGNOSIS — M72.2 PLANTAR FASCIITIS, LEFT: ICD-10-CM

## 2023-07-12 DIAGNOSIS — M25.476 EFFUSION OF SUBTALAR JOINT: Primary | ICD-10-CM

## 2023-07-12 PROCEDURE — 20605 DRAIN/INJ JOINT/BURSA W/O US: CPT | Mod: LT | Performed by: PODIATRIST

## 2023-07-12 RX ORDER — DEXAMETHASONE SODIUM PHOSPHATE 4 MG/ML
4 INJECTION, SOLUTION INTRA-ARTICULAR; INTRALESIONAL; INTRAMUSCULAR; INTRAVENOUS; SOFT TISSUE ONCE
Status: COMPLETED | OUTPATIENT
Start: 2023-07-12 | End: 2023-07-12

## 2023-07-12 RX ORDER — BUPIVACAINE HYDROCHLORIDE 5 MG/ML
1 INJECTION, SOLUTION PERINEURAL ONCE
Status: COMPLETED | OUTPATIENT
Start: 2023-07-12 | End: 2023-07-12

## 2023-07-12 RX ORDER — TRIAMCINOLONE ACETONIDE 40 MG/ML
40 INJECTION, SUSPENSION INTRA-ARTICULAR; INTRAMUSCULAR ONCE
Status: COMPLETED | OUTPATIENT
Start: 2023-07-12 | End: 2023-07-12

## 2023-07-12 RX ADMIN — TRIAMCINOLONE ACETONIDE 40 MG: 40 INJECTION, SUSPENSION INTRA-ARTICULAR; INTRAMUSCULAR at 12:52

## 2023-07-12 RX ADMIN — BUPIVACAINE HYDROCHLORIDE 5 MG: 5 INJECTION, SOLUTION PERINEURAL at 12:51

## 2023-07-12 RX ADMIN — DEXAMETHASONE SODIUM PHOSPHATE 4 MG: 4 INJECTION, SOLUTION INTRA-ARTICULAR; INTRALESIONAL; INTRAMUSCULAR; INTRAVENOUS; SOFT TISSUE at 12:52

## 2023-07-12 NOTE — LETTER
7/12/2023         RE: Chandrakant Cruz  [address] [facility] [address]        Dear Colleague,    Thank you for referring your patient, Chandrakant Cruz, to the [facility]. Please see a copy of my visit note below.      Assessment:      ICD-10-CM    1. Effusion of subtalar joint  M25.476 triamcinolone (KENALOG-40) injection 40 mg     dexamethasone (DECADRON) injection 4 mg     Bupivacaine 0.5 % Injection     DRAIN/INJECT MEDIUM JOINT/BURSA      2. Acquired pes valgus, left  M21.6X2       3. Plantar fasciitis, left  M72.2              Plan:  Orders Placed This Encounter   Procedures     DRAIN/INJECT MEDIUM JOINT/BURSA       Discussed the etiology and treatment of the condition with the patient.  Imaging studies reviewed and discussed with the patient.  Discussed surgical and conservative options.        -Injection-   Procedure:  injection  PARQ session held, verbal consent obtained.  Sterile skin prep.    Location:  Left STJ  Contents:  3ml total, marcaine, kenalog-40, dexamethasone phosphate.  Dressing applied.    Post-injection instructions reviewed including close attention to amount and duration of pain relief.    Continue prior      MRI or boot discussed if not improved    Return:  No follow-ups on file.                Chief Complaint:     Patient presents with:  Left Foot - Pain, RECHECK         HPI:  Chandrakant Cruz is a 37 year old year old female who presents for evaluation of ankle pain.      7/12  Was nearly full pain relief for a day or so, then has returned  Tried running on it - pain returned  Has stopped running now      6/21  Still painful, has not run yet  Cycling, lifting      Pain location- points to arc underneath ankle on L    STj and TNJ L  No injury but increased mileage training for [employer]  Doesn't use orthotics  [employer]ics gel kayano        Past Medical & Surgical History:  Past Medical History:   Diagnosis Date     Acanthamoeba keratitis      ADD (attention deficit  disorder with hyperactivity)       Past Surgical History:   Procedure Laterality Date     none        Family History   Problem Relation Age of Onset     Glaucoma Maternal Grandmother      No Known Problems Mother      Diabetes Father      Heart Failure Father      Macular Degeneration No family hx of      Kidney Disease Father      Hypertension Father      Early Death Father      Heart Disease Father      Hyperlipidemia Father      Cancer Paternal Aunt         lymphoma     Cancer Paternal Uncle         bone cancer      Diabetes Maternal Grandfather      Clotting Disorder Maternal Uncle         Social History:  ?  History   Smoking Status     Never   Smokeless Tobacco     Never     History   Drug Use No     Social History    Substance and Sexual Activity      Alcohol use: Not Currently      Allergies:  ?   Allergies   Allergen Reactions     Nkda [No Known Drug Allergy]         Medications:    Current Outpatient Medications   Medication     cetirizine (ZYRTEC) 10 MG tablet     multivitamin w/minerals (MULTI-VITAMIN) tablet     diclofenac (VOLTAREN) 50 MG EC tablet     polyhexamethylene biguanide 0.2mg/mL (BAQUACIL) 0.2mg/ml compounded ophthalmic solution     No current facility-administered medications for this visit.         Physical Exam:  ?  Vitals:  /68   Pulse 75   Wt 53.5 kg (118 lb)   BMI 23.05 kg/m     General:  WD/WN, in NAD.  A&O x3.  Dermatologic:    Skin is intact, open lesions absent.   Skin texture, turgor is normal.  Vascular:  Pulses palpable.  Digital capillary refill time normal.  Skin temperature is normal.  Generalized edema- none.  Focal edema- mild dorsal midfoot over TNJ left.  Neurologic:    Gross sensation normal.  Gait and balance normal.  Musculoskeletal:  Maximal pain to palpation of dorsal medial TNJ, left.  moderate pain to palpation of sinus tarsi, left.  Ankle ROM smooth, nonpainful left.  STJ, ROM full, pain free left.  MTJ ROM mildly painful, L  Muscle strength 5/5  foot and  ankle bilateral.    Stance:  RCSP Valgus bilateral.  Foot type:  Flexible valgus        Imaging:   x-ray independently reviewed and interpreted by myself today.  Weight-bearing views left foot dated 05/24/23, reveal moderate flatfoot, no appreciable coalition, no STJ or MTJ degeneration,  no navicular Fx.  Mild ant ankle impingement on MO.            Again, thank you for allowing me to participate in the care of your patient.        Sincerely,        Batsheva Romo DPM

## 2023-07-12 NOTE — PATIENT INSTRUCTIONS
"Aftercare for Steroid Injection  Activity:  -Resume normal activity as tolerated.  -Tendon, ligament, fascia injectionons- avoid high-impact activity for 1 week.  Icing:  -May apply to the injection site if needed.  Infection:  -Risk is generally low (<1%), but must be aware of the signs/symptoms.    -Both infection and an inflammatory reaction to the steroid (\"steroid flare\") will cause redness, warmth, and increased pain.   -If these symptoms develop within 12-24h & resolve within 2-3 days- \"steroid flare\"- ice (non-worrisome)  -If these symptoms develop after 24-48h, progressively get worse, & are associated with a fever- possible infection; call the clinic or present to urgent care immediately    "

## 2023-08-08 ENCOUNTER — TELEPHONE (OUTPATIENT)
Dept: OBGYN | Facility: CLINIC | Age: 38
End: 2023-08-08
Payer: COMMERCIAL

## 2023-08-08 NOTE — TELEPHONE ENCOUNTER
[facility] Call Center    Phone Message    May a detailed message be left on voicemail: yes     Reason for Call: The patient called to schedule new to schedule an OBGYN visit for abnormal bleeding. Patient is available this week, but then has very limited availability through the end of August. Patient scheduled for next visit which worked with her scheduled. Patient wanted to know if it was ok to wait that long for an appt for abnormal bleeding, and writer told patient they were not able to advise on that. Patient requested a message be sent to the team to advise. Can someone please call the patient and advise if there is any chance they could be fit in the schedule this week? Thank you!    Action Taken: Message routed to:  [facility] [phone]    Travel Screening: Not Applicable

## 2023-08-08 NOTE — TELEPHONE ENCOUNTER
Pt denies soaking a pad in less than an hour or severe pain. Pt having some light bleeding in between her cycles, does have paragard IUD in place currently.    RN added pt to wait list and gave ED bleeding/pain precautions.    Patient verbalized understanding and agreed to plan.     Rafaela Dooley RN on 8/8/2023 at 9:40 AM

## 2023-09-05 NOTE — PATIENT INSTRUCTIONS
If you have any questions regarding your visit, Please contact your care team.     [facility]: [phone]  To Schedule an Appointment 24/7  Call: [phone] [place] HOU[facility]LEPHONE NUMBER     Sunita Correia- APRN CNP      Sindhu Dixon-Surgery Scheduler  Lin-Surgery Scheduler         Monday 7:30am-2:00pm    Tuesday 7:30am-4:00pm    Wednesday 7:30am-2:00pm    Thursday 7:30am-11:00am    Friday 7:30am-2:00pm [place] [address] [place] [address] [phone] ask for [facility]  [phone] Fax    Imaging Scheduling all locations  [phone]    [facility]  [address] [place] [address] Dr.  [place], [place] [id number] [phone]         Urgent Care locations:  [facility]   Monday-Friday  10 am - 8 pm  Saturday and Sunday   9 am - 5 pm     [phone]   If you need a medication refill, please contact your pharmacy. Please allow 3 business days for your refill to be completed.  As always, Thank you for trusting us with your healthcare needs!      see additional instructions from your care team below

## 2023-09-06 ENCOUNTER — OFFICE VISIT (OUTPATIENT)
Dept: OBGYN | Facility: CLINIC | Age: 38
End: 2023-09-06
Attending: NURSE PRACTITIONER
Payer: COMMERCIAL

## 2023-09-06 VITALS
SYSTOLIC BLOOD PRESSURE: 111 MMHG | DIASTOLIC BLOOD PRESSURE: 72 MMHG | WEIGHT: 122.6 LBS | HEART RATE: 71 BPM | HEIGHT: 60 IN | BODY MASS INDEX: 24.07 KG/M2 | OXYGEN SATURATION: 98 %

## 2023-09-06 DIAGNOSIS — Z13.6 CARDIOVASCULAR SCREENING; LDL GOAL LESS THAN 130: ICD-10-CM

## 2023-09-06 DIAGNOSIS — Z01.419 ENCOUNTER FOR GYNECOLOGICAL EXAMINATION WITHOUT ABNORMAL FINDING: Primary | ICD-10-CM

## 2023-09-06 DIAGNOSIS — Z11.4 SCREENING FOR HIV (HUMAN IMMUNODEFICIENCY VIRUS): ICD-10-CM

## 2023-09-06 DIAGNOSIS — Z11.59 NEED FOR HEPATITIS C SCREENING TEST: ICD-10-CM

## 2023-09-06 DIAGNOSIS — N92.0 EXCESSIVE OR FREQUENT MENSTRUATION: ICD-10-CM

## 2023-09-06 LAB
ERYTHROCYTE [DISTWIDTH] IN BLOOD BY AUTOMATED COUNT: 11.8 % (ref 10–15)
HCT VFR BLD AUTO: 45.3 % (ref 35–47)
HCV AB SERPL QL IA: NONREACTIVE
HGB BLD-MCNC: 15.1 G/DL (ref 11.7–15.7)
HIV 1+2 AB+HIV1 P24 AG SERPL QL IA: NONREACTIVE
MCH RBC QN AUTO: 29.5 PG (ref 26.5–33)
MCHC RBC AUTO-ENTMCNC: 33.3 G/DL (ref 31.5–36.5)
MCV RBC AUTO: 89 FL (ref 78–100)
PLATELET # BLD AUTO: 245 10E3/UL (ref 150–450)
RBC # BLD AUTO: 5.12 10E6/UL (ref 3.8–5.2)
TSH SERPL DL<=0.005 MIU/L-ACNC: 1.87 UIU/ML (ref 0.3–4.2)
WBC # BLD AUTO: 5.5 10E3/UL (ref 4–11)

## 2023-09-06 PROCEDURE — 85027 COMPLETE CBC AUTOMATED: CPT | Performed by: NURSE PRACTITIONER

## 2023-09-06 PROCEDURE — 99385 PREV VISIT NEW AGE 18-39: CPT | Performed by: NURSE PRACTITIONER

## 2023-09-06 PROCEDURE — 87389 HIV-1 AG W/HIV-1&-2 AB AG IA: CPT | Performed by: NURSE PRACTITIONER

## 2023-09-06 PROCEDURE — 86803 HEPATITIS C AB TEST: CPT | Performed by: NURSE PRACTITIONER

## 2023-09-06 PROCEDURE — 36415 COLL VENOUS BLD VENIPUNCTURE: CPT | Performed by: NURSE PRACTITIONER

## 2023-09-06 PROCEDURE — 99213 OFFICE O/P EST LOW 20 MIN: CPT | Mod: 25 | Performed by: NURSE PRACTITIONER

## 2023-09-06 PROCEDURE — 84443 ASSAY THYROID STIM HORMONE: CPT | Performed by: NURSE PRACTITIONER

## 2023-09-06 RX ORDER — LEVOCETIRIZINE DIHYDROCHLORIDE 5 MG/1
5 TABLET, FILM COATED ORAL EVERY EVENING
COMMUNITY

## 2023-09-06 RX ORDER — COPPER 313.4 MG/1
1 INTRAUTERINE DEVICE INTRAUTERINE ONCE
COMMUNITY

## 2023-09-06 ASSESSMENT — ENCOUNTER SYMPTOMS
MYALGIAS: 0
NERVOUS/ANXIOUS: 0
CHILLS: 0
FEVER: 0
COUGH: 0
PARESTHESIAS: 0
HEMATOCHEZIA: 0
HEARTBURN: 0
JOINT SWELLING: 0
NAUSEA: 0
PALPITATIONS: 0
FREQUENCY: 0
HEMATURIA: 0
EYE PAIN: 0
HEADACHES: 0
DIZZINESS: 0
SORE THROAT: 0
DYSURIA: 0
ARTHRALGIAS: 1
WEAKNESS: 0
ABDOMINAL PAIN: 0
DIARRHEA: 0
CONSTIPATION: 0
SHORTNESS OF BREATH: 0

## 2023-09-06 NOTE — PROGRESS NOTES
"   SUBJECTIVE:   CC: Chandrakant is an 37 year old who presents for preventive health visit.     Healthy Habits:     Getting at least 3 servings of Calcium per day:  Yes    Bi-annual eye exam:  Yes    Dental care twice a year:  Yes    Sleep apnea or symptoms of sleep apnea:  None    Diet:  Regular (no restrictions)    Frequency of exercise:  2-3 days/week    Duration of exercise:  15-30 minutes    Taking medications regularly:  Yes    Medication side effects:  Not applicable    Additional concerns today:  Yes    Additional concern over and above preventive exam.  Normal menstrual cycles monthly, last 4 days without significant bleeding. Using Paragard IUD for 8 years. Last cycle, bleeding lasted twice as long and was much heavier than norm. Then around mid cycle had another full on heavy bleed. Was due again mid August, but had just 1 days of light spotting only.   No recent major changes in diet, weight, activity, stress.   No known family history of uterine fibroids/polyps, but relays her mother had a hysterectomy in her 30s due to some \"growth\" and she is unsure on the type of growth.  Maternal aunt with thyroid disorder.   During the heavy bleeding, had dizziness, headaches, fatigue.   Years ago, had tried combined oral contraceptive pill, transdermal patches and vaginal ring-all with mid cycle bleeding.  Overall, no issues with the copper IUD.    Today's PHQ-2 Score:       9/6/2023     9:27 AM   PHQ-2 ( 1999 Pfizer)   Q1: Little interest or pleasure in doing things 0   Q2: Feeling down, depressed or hopeless 0   PHQ-2 Score 0   Q1: Little interest or pleasure in doing things Not at all   Q2: Feeling down, depressed or hopeless Not at all   PHQ-2 Score 0       Social History     Tobacco Use    Smoking status: Never    Smokeless tobacco: Never   Substance Use Topics    Alcohol use: Not Currently           9/6/2023     9:27 AM   Alcohol Use   Prescreen: >3 drinks/day or >7 drinks/week? No     Reviewed orders with " patient.  Reviewed health maintenance and updated orders accordingly - Yes  Patient Active Problem List   Diagnosis    Attention deficit hyperactivity disorder (ADHD)    History of kidney problems    IUD (intrauterine device) in place    Screening for malignant neoplasm of cervix     Past Surgical History:   Procedure Laterality Date    none         Social History     Tobacco Use    Smoking status: Never    Smokeless tobacco: Never   Substance Use Topics    Alcohol use: Not Currently     Family History   Problem Relation Age of Onset    Glaucoma Maternal Grandmother     No Known Problems Mother     Diabetes Father     Heart Failure Father     Macular Degeneration No family hx of     Kidney Disease Father     Hypertension Father     Early Death Father     Heart Disease Father     Hyperlipidemia Father     Cancer Paternal Aunt         lymphoma    Cancer Paternal Uncle         bone cancer     Diabetes Maternal Grandfather     Clotting Disorder Maternal Uncle            Breast Cancer Screening:    Patient under 40 years of age: Routine Mammogram Screening not recommended.   Pertinent mammograms are reviewed under the imaging tab.    History of abnormal Pap smear: NO - age 30-65 PAP every 5 years with negative HPV co-testing recommended      Latest Ref Rng & Units 5/6/2019     2:43 PM   PAP / HPV   PAP Negative for squamous intraepithelial lesion or malignancy. Negative for squamous intraepithelial lesion or malignancy  Electronically signed by Erma Holcomb CT (ASCP) on 5/10/2019 at 11:54 AM      HPV 16 DNA NEG Negative    HPV 18 DNA NEG Negative    Other HR HPV NEG Negative      Reviewed and updated as needed this visit by clinical staff   Tobacco  Allergies  Meds   Med Hx  Surg Hx  Fam Hx  Soc Hx        Reviewed and updated as needed this visit by Provider                 Past Medical History:   Diagnosis Date    Acanthamoeba keratitis     ADD (attention deficit disorder with hyperactivity)     IUD  (intrauterine device) in place     Paragard      Past Surgical History:   Procedure Laterality Date    none       CONSTITUTIONAL: NEGATIVE for fever, chills, change in weight  INTEGUMENTARU/SKIN: NEGATIVE for worrisome rashes, moles or lesions  EYES: NEGATIVE for vision changes or irritation  ENT: NEGATIVE for ear, mouth and throat problems  RESP: NEGATIVE for significant cough or SOB  BREAST: NEGATIVE for masses, tenderness or discharge  CV: NEGATIVE for chest pain, palpitations or peripheral edema  GI: NEGATIVE for nausea, abdominal pain, heartburn, or change in bowel habits  : NEGATIVE for unusual urinary or vaginal symptoms. Periods: see above.  MUSCULOSKELETAL: NEGATIVE for significant arthralgias or myalgia  NEURO: NEGATIVE for weakness, dizziness or paresthesias  PSYCHIATRIC: NEGATIVE for changes in mood or affect     OBJECTIVE:   /72 (BP Location: Right arm, Patient Position: Sitting, Cuff Size: Adult Regular)   Pulse 71   Ht 1.524 m (5')   Wt 55.6 kg (122 lb 9.6 oz)   LMP 08/17/2023 (Exact Date)   SpO2 98%   BMI 23.94 kg/m    Physical Exam  GENERAL: healthy, alert and no distress  NECK: no adenopathy, no asymmetry, masses, or scars and thyroid normal to palpation  RESP: lungs clear to auscultation - no rales, rhonchi or wheezes  BREAST: normal without masses, tenderness or nipple discharge and no palpable axillary masses or adenopathy  CV: regular rate and rhythm, normal S1 S2, no S3 or S4, no murmur, click or rub, no peripheral edema and peripheral pulses strong  ABDOMEN: soft, nontender, no hepatosplenomegaly, no masses and bowel sounds normal   (female): normal female external genitalia, normal urethral meatus, vaginal mucosa pink, moist, well rugated, and normal cervix/adnexa/uterus without masses or discharge. IUD strings visible and appropriate length.  MS: no gross musculoskeletal defects noted, no edema  SKIN: no suspicious lesions or rashes  NEURO: Normal strength and tone,  mentation intact and speech normal  PSYCH: mentation appears normal, affect normal/bright    ASSESSMENT/PLAN:   (Z01.419) Encounter for gynecological examination without abnormal finding  (primary encounter diagnosis)  Comment: Pap smear up to date    (Z11.4) Screening for HIV (human immunodeficiency virus)  Plan: HIV Antigen Antibody Combo          (Z11.59) Need for hepatitis C screening test  Plan: Hepatitis C Screen Reflex to HCV RNA Quant and         Genotype    (Z13.6) CARDIOVASCULAR SCREENING; LDL GOAL LESS THAN 130  Plan: Lipid panel reflex to direct LDL Fasting        (N92.0) Excessive or frequent menstruation  Comment: Discussed our understanding of menstrual changes with patient. Discussed possible etiologies for the menstrual cycle changes. Bloodwork per below and will plan on sending patient for a pelvic ultrasound to further evaluate the reproductive structures. We did discuss the possibility of endometrial hyperplasia or carcinoma and consideration of an endometrial biopsy after imaging.  We briefly reviewed risks and benefits of medical versus surgical therapy. Medical therapy reviewed included hormonal manipulation. Reviewed surgical options briefly. Does not feel this one time abnormal cycle warrants surgical management unless imaging shows otherwise. Overall happy with her current IUD. We will notify her when we have all her results available and discuss options more in depth at that time. She is given an opportunity to ask questions and have them answered.  Plan: US Pelvic Transabdominal and Transvaginal, CBC         with platelets, TSH with free T4 reflex          Patient has been advised of split billing requirements and indicates understanding: Yes  12 minutes spent by me on the date of the encounter doing chart review, history and exam, documentation and further activities per the note - this is time over and above preventive care    COUNSELING:  Reviewed preventive health counseling, as  reflected in patient instructions  Special attention given to:        Regular exercise       Healthy diet/nutrition       Contraception        She reports that she has never smoked. She has never used smokeless tobacco.          MARK Doyle CNP [facility] [place]

## 2023-09-06 NOTE — PROGRESS NOTES
Assessment:      ICD-10-CM    1. Effusion of subtalar joint  M25.476 triamcinolone (KENALOG-40) injection 40 mg     dexamethasone (DECADRON) injection 4 mg     Bupivacaine 0.5 % Injection     DRAIN/INJECT MEDIUM JOINT/BURSA      2. Acquired pes valgus, left  M21.6X2       3. Plantar fasciitis, left  M72.2              Plan:  Orders Placed This Encounter   Procedures     DRAIN/INJECT MEDIUM JOINT/BURSA       Discussed the etiology and treatment of the condition with the patient.  Imaging studies reviewed and discussed with the patient.  Discussed surgical and conservative options.    STJ pain - increased running and flat foot      -Injection-   Procedure:  injection  PARQ session held, verbal consent obtained.  Sterile skin prep.    Location:  Left STJ  Contents:  3ml total, marcaine, kenalog-40, dexamethasone phosphate.  Dressing applied.    Post-injection instructions reviewed including close attention to amount and duration of pain relief.    Continue prior      -NSAID- Rx po, topical  -Immobilization- boot discussed  -Orthoses- SuperFeet; customs discussed if needed  -Activity- reduce impact; ok to run if not painful  -WB- full    MRI if not improved    Return:  No follow-ups on file.                Chief Complaint:     Patient presents with:  Right Foot - RECHECK  Left Foot - RECHECK         HPI:  Chandrakant Cruz is a 37 year old year old female who presents for evaluation of ankle pain.      6/21  Still painful, has not run yet  Cycling, lifting        Pain location- points to arc underneath ankle on L    STj and TNJ L  No injury but increased mileage training for [employer] marathon  Doesn't use orthotics  Asics gel kayano        Past Medical & Surgical History:  Past Medical History:   Diagnosis Date     Acanthamoeba keratitis      ADD (attention deficit disorder with hyperactivity)       Past Surgical History:   Procedure Laterality Date     none        Family History   Problem Relation Age of Onset      Azra Glaucoma Maternal Grandmother      No Known Problems Mother      Diabetes Father      Heart Failure Father      Macular Degeneration No family hx of      Kidney Disease Father      Hypertension Father      Early Death Father      Heart Disease Father      Hyperlipidemia Father      Cancer Paternal Aunt         lymphoma     Cancer Paternal Uncle         bone cancer      Diabetes Maternal Grandfather      Clotting Disorder Maternal Uncle         Social History:  ?  History   Smoking Status     Never   Smokeless Tobacco     Never     History   Drug Use No     Social History    Substance and Sexual Activity      Alcohol use: Not Currently      Allergies:  ?   Allergies   Allergen Reactions     Nkda [No Known Drug Allergy]         Medications:    Current Outpatient Medications   Medication     cetirizine (ZYRTEC) 10 MG tablet     diclofenac (VOLTAREN) 50 MG EC tablet     multivitamin w/minerals (MULTI-VITAMIN) tablet     polyhexamethylene biguanide 0.2mg/mL (BAQUACIL) 0.2mg/ml compounded ophthalmic solution     No current facility-administered medications for this visit.         Physical Exam:  ?  Vitals:  /68   Pulse 63   Wt 53.5 kg (118 lb)   BMI 23.05 kg/m     General:  WD/WN, in NAD.  A&O x3.  Dermatologic:    Skin is intact, open lesions absent.   Skin texture, turgor is normal.  Vascular:  Pulses palpable.  Digital capillary refill time normal.  Skin temperature is normal.  Generalized edema- none.  Focal edema- mild dorsal midfoot over TNJ left.  Neurologic:    Gross sensation normal.  Gait and balance normal.  Musculoskeletal:  Maximal pain to palpation of dorsal medial TNJ, left.  moderate pain to palpation of sinus tarsi, left.  Ankle ROM smooth, nonpainful left.  STJ, ROM full, pain free left.  MTJ ROM mildly painful, L  Muscle strength 5/5  foot and ankle bilateral.    Stance:  RCSP Valgus bilateral.  Foot type:  Flexible valgus        Imaging:   x-ray independently reviewed and interpreted by myself  today.  Weight-bearing views left foot dated 05/24/23, reveal moderate flatfoot, no appreciable coalition, no STJ or MTJ degeneration,  no navicular Fx.  Mild ant ankle impingement on MO.

## 2023-09-19 ENCOUNTER — ANCILLARY PROCEDURE (OUTPATIENT)
Dept: ULTRASOUND IMAGING | Facility: CLINIC | Age: 38
End: 2023-09-19
Attending: NURSE PRACTITIONER
Payer: COMMERCIAL

## 2023-09-19 DIAGNOSIS — N92.0 EXCESSIVE OR FREQUENT MENSTRUATION: ICD-10-CM

## 2023-09-19 PROCEDURE — 76856 US EXAM PELVIC COMPLETE: CPT | Mod: TC | Performed by: STUDENT IN AN ORGANIZED HEALTH CARE EDUCATION/TRAINING PROGRAM

## 2023-09-19 PROCEDURE — 76830 TRANSVAGINAL US NON-OB: CPT | Mod: TC | Performed by: STUDENT IN AN ORGANIZED HEALTH CARE EDUCATION/TRAINING PROGRAM

## 2023-09-20 ENCOUNTER — TELEPHONE (OUTPATIENT)
Dept: OBGYN | Facility: CLINIC | Age: 38
End: 2023-09-20
Payer: COMMERCIAL

## 2023-09-20 NOTE — TELEPHONE ENCOUNTER
Left message on machine for patient to call back regarding results and discussed options for cycles. Sunita FLORES CNP

## 2023-09-21 NOTE — TELEPHONE ENCOUNTER
Telephone call from patient and discussed ultrasound result. Discussed options and at this time, elects to monitor cycles and would consider intervention if this becomes a recurrent concern with her cycles. Questions answered. Sunita FLORES CNP

## 2024-06-07 ENCOUNTER — VIRTUAL VISIT (OUTPATIENT)
Dept: FAMILY MEDICINE | Facility: CLINIC | Age: 39
End: 2024-06-07
Payer: COMMERCIAL

## 2024-06-07 DIAGNOSIS — F90.9 ATTENTION DEFICIT HYPERACTIVITY DISORDER (ADHD), UNSPECIFIED ADHD TYPE: Primary | ICD-10-CM

## 2024-06-07 PROCEDURE — 99203 OFFICE O/P NEW LOW 30 MIN: CPT | Mod: 95 | Performed by: PHYSICIAN ASSISTANT

## 2024-06-07 NOTE — PROGRESS NOTES
Chandrakant is a 38 year old who is being evaluated via a billable video visit.    How would you like to obtain your AVS? MyChart  If the video visit is dropped, the invitation should be resent by: Text to cell phone: [phone]  Will anyone else be joining your video visit? No      Assessment & Plan       ICD-10-CM    1. Attention deficit hyperactivity disorder (ADHD), unspecified ADHD type  F90.9 Adult Mental Health  Referral      Talk to patient about her concerns.   She is due for an updated ADHD assessment.   Referral placed.   Will need to establish care.     Subjective   Chandrakant is a 38 year old, presenting for the following health issues:  Medication Request        6/7/2024     9:34 AM   Additional Questions   Roomed by Jovanni CHAVEZ LPN   Accompanied by Self         6/7/2024     9:34 AM   Patient Reported Additional Medications   Patient reports taking the following new medications None     History of Present Illness       Reason for visit:  ADHD    She eats 4 or more servings of fruits and vegetables daily.She consumes 0 sweetened beverage(s) daily.She exercises with enough effort to increase her heart rate 20 to 29 minutes per day.  She exercises with enough effort to increase her heart rate 4 days per week.   She is taking medications regularly.     History of adhd and on meds for many years. Then got pregnant and stopped it. Would like to start back on it.   [id number] was on medication. Then was off from [id number].   Adderrall 10mg twice a day. Helped her focus.       Review of Systems  Constitutional, HEENT, cardiovascular, pulmonary, gi and gu systems are negative, except as otherwise noted.      Objective           Vitals:  No vitals were obtained today due to virtual visit.    Physical Exam   GENERAL: alert and no distress  EYES: Eyes grossly normal to inspection.  No discharge or erythema, or obvious scleral/conjunctival abnormalities.  RESP: No audible wheeze, cough, or visible cyanosis.    SKIN:  Visible skin clear. No significant rash, abnormal pigmentation or lesions.  NEURO: Cranial nerves grossly intact.  Mentation and speech appropriate for age.  PSYCH: Appropriate affect, tone, and pace of words            Video-Visit Details    Type of service:  Video Visit   Originating Location (pt. Location): Home    Distant Location (provider location):  Off-site  Platform used for Video Visit: Damian  Signed Electronically by: Robin Proctor PA-C

## 2024-10-27 ENCOUNTER — HEALTH MAINTENANCE LETTER (OUTPATIENT)
Age: 39
End: 2024-10-27

## 2024-12-28 ENCOUNTER — HEALTH MAINTENANCE LETTER (OUTPATIENT)
Age: 39
End: 2024-12-28

## 2025-02-04 ENCOUNTER — OFFICE VISIT (OUTPATIENT)
Dept: FAMILY MEDICINE | Facility: CLINIC | Age: 40
End: 2025-02-04
Payer: COMMERCIAL

## 2025-02-04 ENCOUNTER — ANCILLARY PROCEDURE (OUTPATIENT)
Dept: MAMMOGRAPHY | Facility: CLINIC | Age: 40
End: 2025-02-04
Payer: COMMERCIAL

## 2025-02-04 ENCOUNTER — TRANSFERRED RECORDS (OUTPATIENT)
Dept: HEALTH INFORMATION MANAGEMENT | Facility: CLINIC | Age: 40
End: 2025-02-04

## 2025-02-04 VITALS
WEIGHT: 105.8 LBS | DIASTOLIC BLOOD PRESSURE: 73 MMHG | RESPIRATION RATE: 20 BRPM | BODY MASS INDEX: 20.77 KG/M2 | TEMPERATURE: 97.3 F | HEIGHT: 60 IN | HEART RATE: 77 BPM | OXYGEN SATURATION: 100 % | SYSTOLIC BLOOD PRESSURE: 112 MMHG

## 2025-02-04 DIAGNOSIS — Z13.1 SCREENING FOR DIABETES MELLITUS: ICD-10-CM

## 2025-02-04 DIAGNOSIS — Z12.31 VISIT FOR SCREENING MAMMOGRAM: ICD-10-CM

## 2025-02-04 DIAGNOSIS — Z97.5 IUD CONTRACEPTION: ICD-10-CM

## 2025-02-04 DIAGNOSIS — Z00.00 ROUTINE GENERAL MEDICAL EXAMINATION AT A HEALTH CARE FACILITY: Primary | ICD-10-CM

## 2025-02-04 DIAGNOSIS — Z13.6 CARDIOVASCULAR SCREENING; LDL GOAL LESS THAN 160: ICD-10-CM

## 2025-02-04 PROCEDURE — 90656 IIV3 VACC NO PRSV 0.5 ML IM: CPT | Performed by: NURSE PRACTITIONER

## 2025-02-04 PROCEDURE — 91320 SARSCV2 VAC 30MCG TRS-SUC IM: CPT | Performed by: NURSE PRACTITIONER

## 2025-02-04 PROCEDURE — 99396 PREV VISIT EST AGE 40-64: CPT | Mod: 25 | Performed by: NURSE PRACTITIONER

## 2025-02-04 PROCEDURE — 80048 BASIC METABOLIC PNL TOTAL CA: CPT | Performed by: NURSE PRACTITIONER

## 2025-02-04 PROCEDURE — 90471 IMMUNIZATION ADMIN: CPT | Performed by: NURSE PRACTITIONER

## 2025-02-04 PROCEDURE — 90480 ADMN SARSCOV2 VAC 1/ONLY CMP: CPT | Performed by: NURSE PRACTITIONER

## 2025-02-04 PROCEDURE — 90715 TDAP VACCINE 7 YRS/> IM: CPT | Performed by: NURSE PRACTITIONER

## 2025-02-04 PROCEDURE — 36415 COLL VENOUS BLD VENIPUNCTURE: CPT | Performed by: NURSE PRACTITIONER

## 2025-02-04 PROCEDURE — 77063 BREAST TOMOSYNTHESIS BI: CPT | Mod: TC | Performed by: RADIOLOGY

## 2025-02-04 PROCEDURE — 77067 SCR MAMMO BI INCL CAD: CPT | Mod: TC | Performed by: RADIOLOGY

## 2025-02-04 PROCEDURE — 90472 IMMUNIZATION ADMIN EACH ADD: CPT | Performed by: NURSE PRACTITIONER

## 2025-02-04 PROCEDURE — 80061 LIPID PANEL: CPT | Performed by: NURSE PRACTITIONER

## 2025-02-04 RX ORDER — DEXTROAMPHETAMINE SACCHARATE, AMPHETAMINE ASPARTATE, DEXTROAMPHETAMINE SULFATE AND AMPHETAMINE SULFATE 2.5; 2.5; 2.5; 2.5 MG/1; MG/1; MG/1; MG/1
1 TABLET ORAL
COMMUNITY
Start: 2025-01-14

## 2025-02-04 SDOH — HEALTH STABILITY: PHYSICAL HEALTH: ON AVERAGE, HOW MANY DAYS PER WEEK DO YOU ENGAGE IN MODERATE TO STRENUOUS EXERCISE (LIKE A BRISK WALK)?: 4 DAYS

## 2025-02-04 ASSESSMENT — SOCIAL DETERMINANTS OF HEALTH (SDOH): HOW OFTEN DO YOU GET TOGETHER WITH FRIENDS OR RELATIVES?: ONCE A WEEK

## 2025-02-04 ASSESSMENT — PAIN SCALES - GENERAL: PAINLEVEL_OUTOF10: NO PAIN (0)

## 2025-02-04 NOTE — PROGRESS NOTES
"[id number] Care Visit  [facility]  MARK Callahan CNP, Family Medicine  2025      Assessment & Plan     Routine general medical examination at a health care facility  -next preventative care visit in one year.     Screening for diabetes mellitus  - Basic metabolic panel    CARDIOVASCULAR SCREENING; LDL GOAL LESS THAN 160  - Lipid panel reflex to direct LDL Non-fasting  - Basic metabolic panel    IUD contraception  -discussed risks/benefits of hormonal vs nonhormonal IUD. Patient's copper IUD is due to  this year, she is interested in trying Mirena as the copper IUD is giving her heavier periods and cramps. Patient will like to defer PAP until she scheduled IUD appointment. List of providers who do IUD placement provided.   - Ob/Gyn  Referral; Future            Counseling  Appropriate preventive services were addressed with this patient via screening, questionnaire, or discussion as appropriate for fall prevention, nutrition, physical activity, Tobacco-use cessation, social engagement, weight loss and cognition.  Checklist reviewing preventive services available has been given to the patient.  Reviewed patient's diet, addressing concerns and/or questions.           Remberto Stallings is a 39 year old, presenting for the following:  Physical        2025    11:43 AM   Additional Questions   Roomed by Taisha COPELAND   Accompanied by self     Patient here for yearly preventative care visit. In addition, they have the following concerns:    1. Some years ago was being evaluated a potential kidney donor but was told her kidneys are \"too large\" \"possibly due to cysts\" but that she had normal kidney function. Never diagnosed with kidney disease. Never had history of shimon or ckd.     2. Copper iud expires this year,w ill like to discuss alternatives         Non fasting visit  Discuss IUD   Health Care Directive  Patient does not have a Health " Care Directive: Discussed advance care planning with patient; however, patient declined at this time.      2/4/2025   General Health   How would you rate your overall physical health? Good   Feel stress (tense, anxious, or unable to sleep) Not at all         2/4/2025   Nutrition   Three or more servings of calcium each day? Yes   Diet: Regular (no restrictions)   How many servings of fruit and vegetables per day? (!) 2-3   How many sweetened beverages each day? 0-1         2/4/2025   Exercise   Days per week of moderate/strenous exercise 4 days         2/4/2025   Social Factors   Frequency of gathering with friends or relatives Once a week   Worry food won't last until get money to buy more No   Food not last or not have enough money for food? No   Do you have housing? (Housing is defined as stable permanent housing and does not include staying ouside in a car, in a tent, in an abandoned building, in an overnight shelter, or couch-surfing.) Yes   Are you worried about losing your housing? No   Lack of transportation? No   Unable to get utilities (heat,electricity)? No         2/4/2025   Dental   Dentist two times every year? Yes            Today's PHQ-2 Score:       2/4/2025    11:44 AM   PHQ-2 ( 1999 Pfizer)   Q1: Little interest or pleasure in doing things 0   Q2: Feeling down, depressed or hopeless 0   PHQ-2 Score 0    Q1: Little interest or pleasure in doing things Not at all   Q2: Feeling down, depressed or hopeless Not at all   PHQ-2 Score 0       Patient-reported           2/4/2025   Substance Use   Alcohol more than 3/day or more than 7/wk No   Do you use any other substances recreationally? No     Social History     Tobacco Use    Smoking status: Never    Smokeless tobacco: Never   Vaping Use    Vaping status: Never Used   Substance Use Topics    Alcohol use: Not Currently    Drug use: No          Mammogram Screening - Mammogram every 1-2 years updated in Health Maintenance based on mutual decision  making        2/4/2025   STI Screening   New sexual partner(s) since last STI/HIV test? No     History of abnormal Pap smear: No - age 30- 64 PAP with HPV every 5 years recommended        Latest Ref Rng & Units 5/6/2019     2:43 PM   PAP / HPV   PAP Negative for squamous intraepithelial lesion or malignancy. Negative for squamous intraepithelial lesion or malignancy  Electronically signed by Erma Holcomb CT (ASCP) on 5/10/2019 at 11:54 AM      HPV 16 DNA NEG Negative    HPV 18 DNA NEG Negative    Other HR HPV NEG Negative      ASCVD Risk   The ASCVD Risk score (Susan PRUITT, et al., 2019) failed to calculate for the following reasons:    Cannot find a previous HDL lab    Cannot find a previous total cholesterol lab        2/4/2025   Contraception/Family Planning   Questions about contraception or family planning (!) YES        Reviewed and updated as needed this visit by Provider   Tobacco  Allergies  Meds  Problems  Med Hx  Surg Hx  Fam Hx                 Objective    Exam  /73   Pulse 77   Temp 97.3  F (36.3  C) (Tympanic)   Resp 20   Ht 1.524 m (5')   Wt 48 kg (105 lb 12.8 oz)   SpO2 100%   BMI 20.66 kg/m     Estimated body mass index is 20.66 kg/m  as calculated from the following:    Height as of this encounter: 1.524 m (5').    Weight as of this encounter: 48 kg (105 lb 12.8 oz).    Physical Exam  Constitutional:       Appearance: Normal appearance. She is not ill-appearing.   HENT:      Right Ear: Tympanic membrane, ear canal and external ear normal.      Left Ear: Tympanic membrane, ear canal and external ear normal.      Nose: Nose normal. No congestion.      Mouth/Throat:      Mouth: Mucous membranes are moist.      Pharynx: Oropharynx is clear.   Eyes:      Pupils: Pupils are equal, round, and reactive to light.   Cardiovascular:      Rate and Rhythm: Normal rate and regular rhythm.      Pulses: Normal pulses.      Heart sounds: Normal heart sounds. No murmur heard.      No friction rub. No gallop.   Pulmonary:      Effort: Pulmonary effort is normal.      Breath sounds: Normal breath sounds.   Abdominal:      General: Abdomen is flat. Bowel sounds are normal.      Palpations: Abdomen is soft.   Musculoskeletal:         General: Normal range of motion.      Cervical back: Normal range of motion.   Lymphadenopathy:      Cervical: No cervical adenopathy.   Skin:     General: Skin is warm and dry.   Neurological:      General: No focal deficit present.      Mental Status: She is alert and oriented to person, place, and time.   Psychiatric:         Mood and Affect: Mood normal.         Behavior: Behavior normal.         Thought Content: Thought content normal.         Judgment: Judgment normal.               Signed Electronically by: MARK Callahan CNP

## 2025-02-04 NOTE — PATIENT INSTRUCTIONS
Patient Education   Preventive Care Advice   This is general advice given by our system to help you stay healthy. However, your care team may have specific advice just for you. Please talk to your care team about your preventive care needs.  Nutrition  Eat 5 or more servings of fruits and vegetables each day.  Try wheat bread, brown rice and whole grain pasta (instead of white bread, rice, and pasta).  Get enough calcium and vitamin D. Check the label on foods and aim for 100% of the RDA (recommended daily allowance).  Lifestyle  Exercise at least 150 minutes each week  (30 minutes a day, 5 days a week).  Do muscle strengthening activities 2 days a week. These help control your weight and prevent disease.  No smoking.  Wear sunscreen to prevent skin cancer.  Have a dental exam and cleaning every 6 months.  Yearly exams  See your health care team every year to talk about:  Any changes in your health.  Any medicines your care team has prescribed.  Preventive care, family planning, and ways to prevent chronic diseases.  Shots (vaccines)   HPV shots (up to age 26), if you've never had them before.  Hepatitis B shots (up to age 59), if you've never had them before.  COVID-19 shot: Get this shot when it's due.  Flu shot: Get a flu shot every year.  Tetanus shot: Get a tetanus shot every 10 years.  Pneumococcal, hepatitis A, and RSV shots: Ask your care team if you need these based on your risk.  Shingles shot (for age 50 and up)  General health tests  Diabetes screening:  Starting at age 35, Get screened for diabetes at least every 3 years.  If you are younger than age 35, ask your care team if you should be screened for diabetes.  Cholesterol test: At age 39, start having a cholesterol test every 5 years, or more often if advised.  Bone density scan (DEXA): At age 50, ask your care team if you should have this scan for osteoporosis (brittle bones).  Hepatitis C: Get tested at least once in your life.  STIs (sexually  transmitted infections)  Before age 24: Ask your care team if you should be screened for STIs.  After age 24: Get screened for STIs if you're at risk. You are at risk for STIs (including HIV) if:  You are sexually active with more than one person.  You don't use condoms every time.  You or a partner was diagnosed with a sexually transmitted infection.  If you are at risk for HIV, ask about PrEP medicine to prevent HIV.  Get tested for HIV at least once in your life, whether you are at risk for HIV or not.  Cancer screening tests  Cervical cancer screening: If you have a cervix, begin getting regular cervical cancer screening tests starting at age 21.  Breast cancer scan (mammogram): If you've ever had breasts, begin having regular mammograms starting at age 40. This is a scan to check for breast cancer.  Colon cancer screening: It is important to start screening for colon cancer at age 45.  Have a colonoscopy test every 10 years (or more often if you're at risk) Or, ask your provider about stool tests like a FIT test every year or Cologuard test every 3 years.  To learn more about your testing options, visit:   .  For help making a decision, visit:   https://bit.ly/tn30145.  Prostate cancer screening test: If you have a prostate, ask your care team if a prostate cancer screening test (PSA) at age 55 is right for you.  Lung cancer screening: If you are a current or former smoker ages 50 to 80, ask your care team if ongoing lung cancer screenings are right for you.  For informational purposes only. Not to replace the advice of your health care provider. Copyright   2023 [employer] All rights reserved. Clinically reviewed by the [facility]itions [facility]. Queue-it [id number] - REV 01/24.

## 2025-02-05 LAB
ANION GAP SERPL CALCULATED.3IONS-SCNC: 15 MMOL/L (ref 7–15)
BUN SERPL-MCNC: 8.6 MG/DL (ref 6–20)
CALCIUM SERPL-MCNC: 9.1 MG/DL (ref 8.8–10.4)
CHLORIDE SERPL-SCNC: 103 MMOL/L (ref 98–107)
CHOLEST SERPL-MCNC: 172 MG/DL
CREAT SERPL-MCNC: 0.68 MG/DL (ref 0.51–0.95)
EGFRCR SERPLBLD CKD-EPI 2021: >90 ML/MIN/1.73M2
FASTING STATUS PATIENT QL REPORTED: NO
FASTING STATUS PATIENT QL REPORTED: NO
GLUCOSE SERPL-MCNC: 121 MG/DL (ref 70–99)
HCO3 SERPL-SCNC: 24 MMOL/L (ref 22–29)
HDLC SERPL-MCNC: 73 MG/DL
LDLC SERPL CALC-MCNC: 80 MG/DL
NONHDLC SERPL-MCNC: 99 MG/DL
POTASSIUM SERPL-SCNC: 3.6 MMOL/L (ref 3.4–5.3)
SODIUM SERPL-SCNC: 142 MMOL/L (ref 135–145)
TRIGL SERPL-MCNC: 96 MG/DL

## 2025-02-19 ENCOUNTER — MEDICAL CORRESPONDENCE (OUTPATIENT)
Dept: HEALTH INFORMATION MANAGEMENT | Facility: CLINIC | Age: 40
End: 2025-02-19
Payer: COMMERCIAL

## 2025-04-17 RX ORDER — COPPER 313.4 MG/1
1 INTRAUTERINE DEVICE INTRAUTERINE ONCE
Status: CANCELLED | COMMUNITY

## 2025-04-17 RX ORDER — COPPER 313.4 MG/1
1 INTRAUTERINE DEVICE INTRAUTERINE SEE ADMIN INSTRUCTIONS
Status: CANCELLED | COMMUNITY
Start: 2025-04-17

## 2025-04-22 ENCOUNTER — OFFICE VISIT (OUTPATIENT)
Dept: OBGYN | Facility: CLINIC | Age: 40
End: 2025-04-22
Payer: COMMERCIAL

## 2025-04-22 VITALS
HEIGHT: 60 IN | WEIGHT: 107.2 LBS | DIASTOLIC BLOOD PRESSURE: 70 MMHG | BODY MASS INDEX: 21.05 KG/M2 | SYSTOLIC BLOOD PRESSURE: 109 MMHG

## 2025-04-22 DIAGNOSIS — Z30.433 ENCOUNTER FOR REMOVAL AND REINSERTION OF INTRAUTERINE CONTRACEPTIVE DEVICE: Primary | ICD-10-CM

## 2025-04-22 DIAGNOSIS — Z12.4 SCREENING FOR MALIGNANT NEOPLASM OF CERVIX: ICD-10-CM

## 2025-04-22 PROCEDURE — 87624 HPV HI-RISK TYP POOLED RSLT: CPT | Performed by: NURSE PRACTITIONER

## 2025-04-22 PROCEDURE — 3074F SYST BP LT 130 MM HG: CPT | Performed by: NURSE PRACTITIONER

## 2025-04-22 PROCEDURE — 58300 INSERT INTRAUTERINE DEVICE: CPT | Performed by: NURSE PRACTITIONER

## 2025-04-22 PROCEDURE — 3078F DIAST BP <80 MM HG: CPT | Performed by: NURSE PRACTITIONER

## 2025-04-22 PROCEDURE — 58301 REMOVE INTRAUTERINE DEVICE: CPT | Performed by: NURSE PRACTITIONER

## 2025-04-22 NOTE — PROGRESS NOTES
Chandrakant Cruz is a 39 year old year old  who presents today requesting removal of her current Paragard IUD and placement of a Mirena IUD. Current IUD is due to be removed 2025. Patient has done well with the Paragard IUD, but looking to try Mirena at this time in hopes of less menstrual bleeding/cycles as she goes into perimenopause. Would like to update pap smear as well.    The patient meets and is agreeable to the following conditions:  She is not interested in conception in the near future.   There is no previous history of pelvic inflammatory disease.   There is no previous history of ectopic pregnancy.   She is willing to check monthly for the IUD string.   There is no history of unresolved abnormal uterine bleeding.   There is no history of an unresolved abnormal PAP smear.   She has no history of Poncho's disease or an allergy to copper (for Paraguard).   She has had a PAP smear within the ACOG screening guideline.  She denies the possibility of pregnancy.     We discussed risks, benefits, and alternatives including but not limited to:   Possibility of pregnancy and ectopic pregnancy.  Possibility of pelvic inflammatory disease, particularly with new partners.  Risk of uterine perforation or IUD expulsion.  Possibility of difficult removal.  Spotting or heavy bleeding.  Cramping, pain or infection during or after insertion.    The patient was given patient information on the IUD and the patient education brochure from the .  The patient has given consent to proceed with removal of and new placement of the IUD.  She wishes to proceed.  All questions answered.    PROCEDURE:    Type of IUD: Removal Paragard IUD and insertion Mirena IUD    The patient is placed in a dorsal lithotomy position and first a pap smear is completed, then a pelvic exam is performed to determine the position of the uterus. The cervix is identified and the IUD easily removed intact with a ring forceps. Pt shown the intact  IUD.  Cervix is then cleaned with betadine. A single tooth tenaculum is applied to the anterior lip of the cervix for stabilization. The uterus sounded to 7.0 cm. (Target sound depth is 6.5 cm to 8.5 cm.) The IUD insertion tube is prepared to manufacturers recommendations and inserted into the uterus under sterile conditions in the usual fashion. The IUD string is then cut to 3.0 cm.    The patient tolerated this procedure without immediate complication.  The patient is to return or call immediately for any unexplained fever, abdominal or pelvic pain, excessive bleeding, possibility of pregnancy, foul-smelling discharge, sense that the IUD has been expelled.  All questions were answered.    Return to clinic in 1 month for IUD check    Sunita FLORES CNP

## 2025-04-23 LAB
HPV HR 12 DNA CVX QL NAA+PROBE: NEGATIVE
HPV16 DNA CVX QL NAA+PROBE: NEGATIVE
HPV18 DNA CVX QL NAA+PROBE: NEGATIVE
HUMAN PAPILLOMA VIRUS FINAL DIAGNOSIS: NORMAL

## 2025-08-12 ENCOUNTER — OFFICE VISIT (OUTPATIENT)
Dept: OBGYN | Facility: CLINIC | Age: 40
End: 2025-08-12
Payer: COMMERCIAL

## 2025-08-12 VITALS
HEART RATE: 67 BPM | WEIGHT: 108 LBS | BODY MASS INDEX: 21.2 KG/M2 | SYSTOLIC BLOOD PRESSURE: 104 MMHG | OXYGEN SATURATION: 100 % | HEIGHT: 60 IN | DIASTOLIC BLOOD PRESSURE: 68 MMHG

## 2025-08-12 DIAGNOSIS — Z30.433 ENCOUNTER FOR REMOVAL AND REINSERTION OF INTRAUTERINE CONTRACEPTIVE DEVICE (IUD): Primary | ICD-10-CM

## 2025-08-12 PROCEDURE — 3074F SYST BP LT 130 MM HG: CPT | Performed by: NURSE PRACTITIONER

## 2025-08-12 PROCEDURE — 58301 REMOVE INTRAUTERINE DEVICE: CPT | Performed by: NURSE PRACTITIONER

## 2025-08-12 PROCEDURE — 3078F DIAST BP <80 MM HG: CPT | Performed by: NURSE PRACTITIONER

## 2025-08-12 PROCEDURE — 58300 INSERT INTRAUTERINE DEVICE: CPT | Performed by: NURSE PRACTITIONER

## 2025-08-12 RX ORDER — COPPER 313.4 MG/1
1 INTRAUTERINE DEVICE INTRAUTERINE ONCE
COMMUNITY

## 2025-08-12 RX ORDER — COPPER 313.4 MG/1
1 INTRAUTERINE DEVICE INTRAUTERINE SEE ADMIN INSTRUCTIONS
Status: ACTIVE | COMMUNITY
Start: 2025-08-12

## 2025-08-12 RX ADMIN — COPPER 1 EACH: 313.4 INTRAUTERINE DEVICE INTRAUTERINE at 08:44
